# Patient Record
Sex: MALE | Race: WHITE | NOT HISPANIC OR LATINO | Employment: OTHER | URBAN - METROPOLITAN AREA
[De-identification: names, ages, dates, MRNs, and addresses within clinical notes are randomized per-mention and may not be internally consistent; named-entity substitution may affect disease eponyms.]

---

## 2020-08-17 PROBLEM — Z96.641 HISTORY OF HIP REPLACEMENT, TOTAL, RIGHT: Status: ACTIVE | Noted: 2020-08-17

## 2020-08-18 ENCOUNTER — TELEPHONE (OUTPATIENT)
Dept: FAMILY MEDICINE | Facility: CLINIC | Age: 75
End: 2020-08-18

## 2020-08-18 ENCOUNTER — OFFICE VISIT (OUTPATIENT)
Dept: FAMILY MEDICINE | Facility: CLINIC | Age: 75
End: 2020-08-18
Payer: MEDICARE

## 2020-08-18 VITALS
BODY MASS INDEX: 25.16 KG/M2 | DIASTOLIC BLOOD PRESSURE: 68 MMHG | SYSTOLIC BLOOD PRESSURE: 147 MMHG | WEIGHT: 166 LBS | HEIGHT: 68 IN | TEMPERATURE: 97.7 F | HEART RATE: 41 BPM | OXYGEN SATURATION: 100 %

## 2020-08-18 DIAGNOSIS — H61.22 IMPACTED CERUMEN OF LEFT EAR: ICD-10-CM

## 2020-08-18 DIAGNOSIS — Z13.6 CARDIOVASCULAR SCREENING; LDL GOAL LESS THAN 130: ICD-10-CM

## 2020-08-18 DIAGNOSIS — R00.1 BRADYCARDIA: ICD-10-CM

## 2020-08-18 DIAGNOSIS — R03.0 ELEVATED BLOOD PRESSURE READING WITHOUT DIAGNOSIS OF HYPERTENSION: ICD-10-CM

## 2020-08-18 DIAGNOSIS — K62.5 RECTAL BLEEDING: Primary | ICD-10-CM

## 2020-08-18 DIAGNOSIS — Z12.11 COLON CANCER SCREENING: ICD-10-CM

## 2020-08-18 DIAGNOSIS — Z96.641 HISTORY OF HIP REPLACEMENT, TOTAL, RIGHT: ICD-10-CM

## 2020-08-18 LAB
ANION GAP SERPL CALCULATED.3IONS-SCNC: 6 MMOL/L (ref 3–14)
BUN SERPL-MCNC: 15 MG/DL (ref 7–30)
CALCIUM SERPL-MCNC: 8.9 MG/DL (ref 8.5–10.1)
CHLORIDE SERPL-SCNC: 107 MMOL/L (ref 94–109)
CHOLEST SERPL-MCNC: 212 MG/DL
CO2 SERPL-SCNC: 26 MMOL/L (ref 20–32)
CREAT SERPL-MCNC: 1.04 MG/DL (ref 0.66–1.25)
ERYTHROCYTE [DISTWIDTH] IN BLOOD BY AUTOMATED COUNT: 13.3 % (ref 10–15)
GFR SERPL CREATININE-BSD FRML MDRD: 70 ML/MIN/{1.73_M2}
GLUCOSE SERPL-MCNC: 89 MG/DL (ref 70–99)
HCT VFR BLD AUTO: 46.8 % (ref 40–53)
HDLC SERPL-MCNC: 71 MG/DL
HGB BLD-MCNC: 15.6 G/DL (ref 13.3–17.7)
LDLC SERPL CALC-MCNC: 116 MG/DL
MCH RBC QN AUTO: 33.3 PG (ref 26.5–33)
MCHC RBC AUTO-ENTMCNC: 33.3 G/DL (ref 31.5–36.5)
MCV RBC AUTO: 100 FL (ref 78–100)
NONHDLC SERPL-MCNC: 141 MG/DL
PLATELET # BLD AUTO: 210 10E9/L (ref 150–450)
POTASSIUM SERPL-SCNC: 4.1 MMOL/L (ref 3.4–5.3)
RBC # BLD AUTO: 4.68 10E12/L (ref 4.4–5.9)
SODIUM SERPL-SCNC: 139 MMOL/L (ref 133–144)
TRIGL SERPL-MCNC: 127 MG/DL
WBC # BLD AUTO: 6.7 10E9/L (ref 4–11)

## 2020-08-18 PROCEDURE — 93000 ELECTROCARDIOGRAM COMPLETE: CPT | Mod: 59 | Performed by: INTERNAL MEDICINE

## 2020-08-18 PROCEDURE — 36415 COLL VENOUS BLD VENIPUNCTURE: CPT | Performed by: INTERNAL MEDICINE

## 2020-08-18 PROCEDURE — 80061 LIPID PANEL: CPT | Performed by: INTERNAL MEDICINE

## 2020-08-18 PROCEDURE — 80048 BASIC METABOLIC PNL TOTAL CA: CPT | Performed by: INTERNAL MEDICINE

## 2020-08-18 PROCEDURE — 69209 REMOVE IMPACTED EAR WAX UNI: CPT | Mod: LT | Performed by: INTERNAL MEDICINE

## 2020-08-18 PROCEDURE — 85027 COMPLETE CBC AUTOMATED: CPT | Performed by: INTERNAL MEDICINE

## 2020-08-18 PROCEDURE — 99203 OFFICE O/P NEW LOW 30 MIN: CPT | Mod: 25 | Performed by: INTERNAL MEDICINE

## 2020-08-18 ASSESSMENT — MIFFLIN-ST. JEOR: SCORE: 1466.44

## 2020-08-18 NOTE — TELEPHONE ENCOUNTER
TC received from provider..              Please call him and ask him if he would be agreeable to wearing a heart monitor for a couple weeks ([place]).  We could set it up here sometime this week.   To watch his low heart rate.

## 2020-08-18 NOTE — Clinical Note
Please call him and ask him if he would be agreeable to wearing a heart monitor for a couple weeks ([place]).  We could set it up here sometime this week.   To watch his low heart rate.

## 2020-08-18 NOTE — PROGRESS NOTES
"Subjective     Aurelio Hassan is a 74 year old male who presents to clinic today for the following health issues:    HPI   73 y/o  male, new to this clinic.  Made this appointment due to Rectal bleeding.   Painless.       Not always with a BM    no Constipation     No Fever/sweats/chills.  Appetite is okay    Reports colonoscopy negative \"many years ago\"         Rectal bleeding       Duration: 2-3 weeks     Description (location/character/radiation): rectal bleeding     Intensity:  moderate    Accompanying signs and symptoms: none    History (similar episodes/previous evaluation): None    Precipitating or alleviating factors: None    Therapies tried and outcome: None     New Patient/Transfer of Care    Patient Active Problem List   Diagnosis     Nuclear sclerosis of both eyes     Posterior vitreous detachment of right eye     Pterygium of left eye     History of hip replacement, total, right     CARDIOVASCULAR SCREENING; LDL GOAL LESS THAN 130     History reviewed. No pertinent surgical history.    Social History     Tobacco Use     Smoking status: Former Smoker     Last attempt to quit: 1970     Years since quittin.6     Smokeless tobacco: Never Used   Substance Use Topics     Alcohol use: Yes     Comment: 2 drinks daily      History reviewed. No pertinent family history.      No current outpatient medications on file.     No Known Allergies  No lab results found.   BP Readings from Last 3 Encounters:   20 (!) 147/68    Wt Readings from Last 3 Encounters:   20 75.3 kg (166 lb)                    Reviewed and updated as needed this visit by Provider         Review of Systems   Constitutional, HEENT, cardiovascular, pulmonary, gi and gu systems are negative, except as otherwise noted.      Objective    BP (!) 147/68 (BP Location: Left arm, Patient Position: Sitting, Cuff Size: Adult Regular)   Pulse (!) 41   Temp 97.7  F (36.5  C) (Oral)   Ht 1.734 m (5' 8.25\")   Wt 75.3 kg (166 lb)   " SpO2 100%   BMI 25.06 kg/m    Body mass index is 25.06 kg/m .     Physical Exam   GENERAL: healthy, alert and no distress  EYES: Eyes grossly normal to inspection, PERRL and conjunctivae and sclerae normal  HENT: ear canals and TM's normal, nose and mouth without ulcers or lesions  HENT: L cerumen impaction, s/p successful irrigation.   NECK: no adenopathy, no asymmetry, masses, or scars and thyroid normal to palpation  RESP: lungs clear to auscultation - no rales, rhonchi or wheezes  CV: regular rate and rhythm, bradycardia... normal S1 S2, no S3 or S4, no murmur, click or rub, no peripheral edema and peripheral pulses strong  ABDOMEN: soft, nontender, no hepatosplenomegaly, no masses and bowel sounds normal  RECTAL: normal sphincter tone, no rectal masses, prostate normal size, smooth, nontender without nodules or masses   Insignificant, non bleeding hernia at 5 or 6 oclock.    No pain during exam.   MS: no gross musculoskeletal defects noted, no edema  SKIN: no suspicious lesions or rashes  NEURO: Normal strength and tone, mentation intact and speech normal  PSYCH: mentation appears normal, affect normal/bright    Diagnostic Test Results:  Labs reviewed in Epic  Results for orders placed or performed in visit on 08/18/20 (from the past 24 hour(s))   CBC with platelets   Result Value Ref Range    WBC 6.7 4.0 - 11.0 10e9/L    RBC Count 4.68 4.4 - 5.9 10e12/L    Hemoglobin 15.6 13.3 - 17.7 g/dL    Hematocrit 46.8 40.0 - 53.0 %     78 - 100 fl    MCH 33.3 (H) 26.5 - 33.0 pg    MCHC 33.3 31.5 - 36.5 g/dL    RDW 13.3 10.0 - 15.0 %    Platelet Count 210 150 - 450 10e9/L     BMP and FLP are pending    EKG = Sinus bradycardia with 1st degree AVB, HR 37         Assessment & Plan     1. Rectal bleeding  *no findings on exam.  Colonoscopy overdue.   - CBC with platelets    2. History of hip replacement, total, right       3. Elevated blood pressure reading without diagnosis of hypertension   no meds added   - Basic  "metabolic panel    4. CARDIOVASCULAR SCREENING; LDL GOAL LESS THAN 130     - Lipid panel reflex to direct LDL Non-fasting    5. Colon cancer screening     - GASTROENTEROLOGY ADULT REF PROCEDURE ONLY; Future    6. Bradycardia   his HR is low, he is completely asymptomatic.  He reports his HR usually in low 50's.  He is active and has had no symptoms   - EKG 12-lead complete w/read - Clinics  LATER ENTRY:   He will get HOLTER for a couple weeks.      7. Impacted cerumen of left ear     - REMOVE IMPACTED CERUMEN     BMI:   Estimated body mass index is 25.06 kg/m  as calculated from the following:    Height as of this encounter: 1.734 m (5' 8.25\").    Weight as of this encounter: 75.3 kg (166 lb).                Return in about 4 weeks (around 9/15/2020) for Physical Exam w/Dr Black as scheduled.    Ashlee Williamson MD  [facility]    "

## 2020-08-18 NOTE — LETTER
[facility]   [address] [place]  [address]  [phone]                                   August 19, 2020    Aurelio Hassan  [address]  [facility] [address]        Dear Aurelio,    Enclosed are your results.  Your labs are normal/stable at this time.   Cholesterol looks great.  Electrolytes and kidney function are normal.   I checked for anemia, and your blood counts are completely normal at this time.     Please call  with any questions.  We can also discuss any questions regarding these labs at your next scheduled visit.     Results for orders placed or performed in visit on 08/18/20   Basic metabolic panel     Status: None   Result Value Ref Range    Sodium 139 133 - 144 mmol/L    Potassium 4.1 3.4 - 5.3 mmol/L    Chloride 107 94 - 109 mmol/L    Carbon Dioxide 26 20 - 32 mmol/L    Anion Gap 6 3 - 14 mmol/L    Glucose 89 70 - 99 mg/dL    Urea Nitrogen 15 7 - 30 mg/dL    Creatinine 1.04 0.66 - 1.25 mg/dL    GFR Estimate 70 >60 mL/min/[1.73_m2]    GFR Estimate If Black 81 >60 mL/min/[1.73_m2]    Calcium 8.9 8.5 - 10.1 mg/dL   Lipid panel reflex to direct LDL Non-fasting     Status: Abnormal   Result Value Ref Range    Cholesterol 212 (H) <200 mg/dL    Triglycerides 127 <150 mg/dL    HDL Cholesterol 71 >39 mg/dL    LDL Cholesterol Calculated 116 (H) <100 mg/dL    Non HDL Cholesterol 141 (H) <130 mg/dL   CBC with platelets     Status: Abnormal   Result Value Ref Range    WBC 6.7 4.0 - 11.0 10e9/L    RBC Count 4.68 4.4 - 5.9 10e12/L    Hemoglobin 15.6 13.3 - 17.7 g/dL    Hematocrit 46.8 40.0 - 53.0 %     78 - 100 fl    MCH 33.3 (H) 26.5 - 33.0 pg    MCHC 33.3 31.5 - 36.5 g/dL    RDW 13.3 10.0 - 15.0 %    Platelet Count 210 150 - 450 10e9/L       If you have any questions please call the clinic at [phone]    Sincerely,    Ashlee Williamson MD  bmd

## 2020-08-18 NOTE — TELEPHONE ENCOUNTER
Attempted to call patient at home number, not in service.    Attempted to call patient at mobile number, left message on voicemail; patient was instructed to return call to [facility] RN directly on the RN call back line at [phone]     Patient was seen by [facility] today, abnormal EKG, see below, advising zio patch.    Kaila Wang, BABITA  [facility]

## 2020-08-19 NOTE — TELEPHONE ENCOUNTER
Patient returned call, nurse picked up call, informed of provider message regarding Ziopatch. Scheduled ancillary for application on Monday August 24 th. Nurse answered patient questions to best of ability.    Patient verbalized understanding and agreement with plan and had no further questions.      Loli Galloway RN  Triage Nurse  [facility]  Appointment line: [phone]  [facility], 24 hour nurse line, available by calling clinic at [phone] and following prompts.

## 2020-08-19 NOTE — TELEPHONE ENCOUNTER
Called patient at [phone]. Left message to return call to nurse triage line at [phone].    Rosette Mccarty RN

## 2020-08-19 NOTE — RESULT ENCOUNTER NOTE
Aurelio Hassan    Enclosed are your results.  Your labs are normal/stable at this time.   Cholesterol looks great.  Electrolytes and kidney function are normal.   I checked for anemia, and your blood counts are completely normal at this time.     Please call  with any questions.  We can also discuss any questions regarding these labs at your next scheduled visit.    Sincerely,    Ashlee Williamson M.D.

## 2020-08-19 NOTE — TELEPHONE ENCOUNTER
Patient returned call to triage line.  He left a message stating to call him back at [phone] (A).    Ange Charles RN,BSN  [facility]

## 2020-08-20 ENCOUNTER — MYC MEDICAL ADVICE (OUTPATIENT)
Dept: FAMILY MEDICINE | Facility: CLINIC | Age: 75
End: 2020-08-20

## 2020-08-20 NOTE — TELEPHONE ENCOUNTER
Patient was seen 8/18/20 by [facility].    I see colonoscopy referral was signed.   [facility] location.    Routed message to patient advising referral is in chart.    Routed to team to reach out to [facility], does something need to be faxed?    Kaila Wang RN  [facility]

## 2020-08-24 ENCOUNTER — TELEPHONE (OUTPATIENT)
Dept: FAMILY MEDICINE | Facility: CLINIC | Age: 75
End: 2020-08-24

## 2020-08-24 ENCOUNTER — ALLIED HEALTH/NURSE VISIT (OUTPATIENT)
Dept: NURSING | Facility: CLINIC | Age: 75
End: 2020-08-24
Payer: MEDICARE

## 2020-08-24 DIAGNOSIS — R00.1 BRADYCARDIA: Primary | ICD-10-CM

## 2020-08-24 DIAGNOSIS — R00.1 BRADYCARDIA: ICD-10-CM

## 2020-08-24 PROCEDURE — 99207 ZZC NO CHARGE NURSE ONLY: CPT

## 2020-08-24 PROCEDURE — 0296T ZZHC ZIO PATCH HOLTER APPLICATION: CPT

## 2020-08-26 ENCOUNTER — TELEPHONE (OUTPATIENT)
Dept: SURGERY | Facility: CLINIC | Age: 75
End: 2020-08-26

## 2020-08-26 DIAGNOSIS — Z11.59 ENCOUNTER FOR SCREENING FOR OTHER VIRAL DISEASES: Primary | ICD-10-CM

## 2020-08-26 NOTE — TELEPHONE ENCOUNTER
Location: [facility]  Is this a cancellation or reschedule?  no  The name of the procedure: Colonoscopy  Provider scheduled with: Adore  Date 9/21/2020, Time 9

## 2020-09-04 ENCOUNTER — OFFICE VISIT (OUTPATIENT)
Dept: FAMILY MEDICINE | Facility: CLINIC | Age: 75
End: 2020-09-04
Payer: MEDICARE

## 2020-09-04 VITALS
BODY MASS INDEX: 25.36 KG/M2 | OXYGEN SATURATION: 99 % | HEART RATE: 45 BPM | TEMPERATURE: 97.9 F | SYSTOLIC BLOOD PRESSURE: 128 MMHG | DIASTOLIC BLOOD PRESSURE: 73 MMHG | WEIGHT: 168 LBS

## 2020-09-04 DIAGNOSIS — Z00.00 ENCOUNTER FOR MEDICARE ANNUAL WELLNESS EXAM: Primary | ICD-10-CM

## 2020-09-04 DIAGNOSIS — R00.1 SINUS BRADYCARDIA: ICD-10-CM

## 2020-09-04 DIAGNOSIS — Z85.828 HISTORY OF SKIN CANCER: ICD-10-CM

## 2020-09-04 DIAGNOSIS — Z87.19 HISTORY OF RECTAL BLEEDING: ICD-10-CM

## 2020-09-04 PROCEDURE — G0438 PPPS, INITIAL VISIT: HCPCS | Performed by: FAMILY MEDICINE

## 2020-09-04 ASSESSMENT — ENCOUNTER SYMPTOMS
JOINT SWELLING: 0
EYE PAIN: 0
PARESTHESIAS: 0
COUGH: 0
CONSTIPATION: 0
MYALGIAS: 0
ARTHRALGIAS: 0
SHORTNESS OF BREATH: 0
HEMATURIA: 0
ABDOMINAL PAIN: 0
NAUSEA: 0
DIZZINESS: 0
CHILLS: 0
NERVOUS/ANXIOUS: 0
HEMATOCHEZIA: 0
HEARTBURN: 0
SORE THROAT: 0
WEAKNESS: 0
FREQUENCY: 0
DYSURIA: 0
FEVER: 0
PALPITATIONS: 0
DIARRHEA: 0
HEADACHES: 0

## 2020-09-04 ASSESSMENT — ACTIVITIES OF DAILY LIVING (ADL): CURRENT_FUNCTION: NO ASSISTANCE NEEDED

## 2020-09-04 ASSESSMENT — PAIN SCALES - GENERAL: PAINLEVEL: NO PAIN (0)

## 2020-09-04 NOTE — PROGRESS NOTES
"SUBJECTIVE:   Aurelio Hassan is a 74 year old male who presents for a Preventive Visit and to establish primary care.  He is fairly new to our clinic.  His wife sees Dr. Williamson in our clinic and he saw her a couple of weeks ago with some rectal bleeding.  His rectal bleeding has stopped, but he is set up to have a colonoscopy in a couple of weeks to further evaluate that.  He is generally quite healthy.  He is on no routine medications.  He plays tennis a few times per week.  He has had some skin cancers removed on the left side of his neck and the left upper chest.  He does see dermatology on a regular basis.  He does spend 6 months or so in [place], [place] each winter.    He had a low resting heart rate when he was seen a couple of weeks ago and he is now undergoing a Zio patch evaluation for that.  He is asymptomatic with it.    Are you in the first 12 months of your Medicare coverage?  No    Healthy Habits:     In general, how would you rate your overall health?  Excellent    Frequency of exercise:  4-5 days/week    Duration of exercise:  45-60 minutes    Do you usually eat at least 4 servings of fruit and vegetables a day, include whole grains    & fiber and avoid regularly eating high fat or \"junk\" foods?  Yes    Taking medications regularly:  Yes    Ability to successfully perform activities of daily living:  No assistance needed    Home Safety:  No safety concerns identified    Hearing Impairment:  Need to ask people to speak up or repeat themselves    In the past 6 months, have you been bothered by leaking of urine?  No    In general, how would you rate your overall mental or emotional health?  Excellent      PHQ-2 Total Score: 0    Additional concerns today:  No     Do you feel safe in your environment? Yes    Have you ever done Advance Care Planning? (For example, a Health Directive, POLST, or a discussion with a medical provider or your loved ones about your wishes): Yes, patient states has an " Advance Care Planning document and will bring a copy to the clinic.      Fall risk  Fallen 2 or more times in the past year?: No  Any fall with injury in the past year?: No    Cognitive Screening   1) Repeat 3 items (Leader, Season, Table)    2) Clock draw: NORMAL  3) 3 item recall: Recalls 3 objects  Results: 3 items recalled: COGNITIVE IMPAIRMENT LESS LIKELY    Mini-CogTM Copyright STEPH Torres. Licensed by the author for use in [facility]; reprinted with permission (elio@[facility]). All rights reserved.      Do you have sleep apnea, excessive snoring or daytime drowsiness?: no    Reviewed and updated as needed this visit by clinical staff         Reviewed and updated as needed this visit by Provider        Social History     Tobacco Use     Smoking status: Former Smoker     Last attempt to quit: 1970     Years since quittin.7     Smokeless tobacco: Never Used   Substance Use Topics     Alcohol use: Yes     Comment: 2 drinks daily      If you drink alcohol do you typically have >3 drinks per day or >7 drinks per week? No    Alcohol Use 2020   Prescreen: >3 drinks/day or >7 drinks/week? No   No flowsheet data found.      Vee Coughlin MA      Current providers sharing in care for this patient include:   Patient Care Team:  Win Black MD as PCP - General (Family Practice)    The following health maintenance items are reviewed in Epic and correct as of today:  Health Maintenance   Topic Date Due     COLORECTAL CANCER SCREENING  1955     MEDICARE ANNUAL WELLNESS VISIT  2009     AORTIC ANEURYSM SCREENING (SYSTEM ASSIGNED)  2009     EYE EXAM  2017     INFLUENZA VACCINE (1) 2020     ZOSTER IMMUNIZATION (2 of 2) 2020     FALL RISK ASSESSMENT  2021     LIPID  2025     ADVANCE CARE PLANNING  2025     DTAP/TDAP/TD IMMUNIZATION (3 - Td) 2027     PHQ-2  Completed     PNEUMOCOCCAL IMMUNIZATION 65+ LOW/MEDIUM RISK  Completed     IPV  IMMUNIZATION  Aged Out     MENINGITIS IMMUNIZATION  Aged Out     HEPATITIS B IMMUNIZATION  Aged Out     Patient Active Problem List   Diagnosis     Nuclear sclerosis of both eyes     Posterior vitreous detachment of right eye     Pterygium of left eye     History of hip replacement, total, right     CARDIOVASCULAR SCREENING; LDL GOAL LESS THAN 130     History of skin cancer     Sinus bradycardia     History reviewed. No pertinent surgical history.    Social History     Tobacco Use     Smoking status: Former Smoker     Last attempt to quit: 1970     Years since quittin.7     Smokeless tobacco: Never Used   Substance Use Topics     Alcohol use: Yes     Comment: 2 drinks daily      History reviewed. No pertinent family history.      No current outpatient medications on file.     No Known Allergies      Review of Systems   Constitutional: Negative for chills and fever.   HENT: Positive for hearing loss. Negative for congestion, ear pain and sore throat.    Eyes: Negative for pain and visual disturbance.   Respiratory: Negative for cough and shortness of breath.    Cardiovascular: Negative for chest pain, palpitations and peripheral edema.   Gastrointestinal: Negative for abdominal pain, constipation, diarrhea, heartburn, hematochezia and nausea.   Genitourinary: Negative for discharge, dysuria, frequency, genital sores, hematuria, impotence and urgency.   Musculoskeletal: Negative for arthralgias, joint swelling and myalgias.   Skin: Negative for rash.   Neurological: Negative for dizziness, weakness, headaches and paresthesias.   Psychiatric/Behavioral: Negative for mood changes. The patient is not nervous/anxious.          OBJECTIVE:   /73 (BP Location: Right arm, Patient Position: Chair, Cuff Size: Adult Regular)   Pulse (!) 45   Temp 97.9  F (36.6  C) (Oral)   Wt 76.2 kg (168 lb)   SpO2 99%   BMI 25.36 kg/m   Estimated body mass index is 25.36 kg/m  as calculated from the following:    Height as  "of 8/18/20: 1.734 m (5' 8.25\").    Weight as of this encounter: 76.2 kg (168 lb).  Physical Exam  GENERAL: healthy, alert and no distress  EYES: Eyes grossly normal to inspection, PERRL and conjunctivae and sclerae normal  HENT:   NECK: no adenopathy, no asymmetry, masses, or scars and thyroid normal to palpation  RESP: lungs clear to auscultation - no rales, rhonchi or wheezes  CV: Bradycardic regular rate and rhythm, normal S1 S2, no S3 or S4, no murmur, click or rub, no peripheral edema and peripheral pulses strong  ABDOMEN: soft, nontender, no hepatosplenomegaly, no masses and bowel sounds normal  MS: no gross musculoskeletal defects noted, no edema  SKIN: no suspicious lesions or rashes, but well-healed surgical excision sites on the left side of his neck inferior and posterior to the left ear and also on the left upper chest.  NEURO: Normal strength and tone, mentation intact and speech normal  PSYCH: mentation appears normal, affect normal/bright    Diagnostic Test Results:  Labs reviewed in Epic  Office Visit on 08/18/2020   Component Date Value Ref Range Status     Sodium 08/18/2020 139  133 - 144 mmol/L Final     Potassium 08/18/2020 4.1  3.4 - 5.3 mmol/L Final     Chloride 08/18/2020 107  94 - 109 mmol/L Final     Carbon Dioxide 08/18/2020 26  20 - 32 mmol/L Final     Anion Gap 08/18/2020 6  3 - 14 mmol/L Final     Glucose 08/18/2020 89  70 - 99 mg/dL Final    Non Fasting     Urea Nitrogen 08/18/2020 15  7 - 30 mg/dL Final     Creatinine 08/18/2020 1.04  0.66 - 1.25 mg/dL Final     GFR Estimate 08/18/2020 70  >60 mL/min/[1.73_m2] Final    Comment: Non  GFR Calc  Starting 12/18/2018, serum creatinine based estimated GFR (eGFR) will be   calculated using the Chronic Kidney Disease Epidemiology Collaboration   (CKD-EPI) equation.       GFR Estimate If Black 08/18/2020 81  >60 mL/min/[1.73_m2] Final    Comment:  GFR Calc  Starting 12/18/2018, serum creatinine based estimated " GFR (eGFR) will be   calculated using the Chronic Kidney Disease Epidemiology Collaboration   (CKD-EPI) equation.       Calcium 08/18/2020 8.9  8.5 - 10.1 mg/dL Final     Cholesterol 08/18/2020 212* <200 mg/dL Final    Desirable:       <200 mg/dl     Triglycerides 08/18/2020 127  <150 mg/dL Final    Non Fasting     HDL Cholesterol 08/18/2020 71  >39 mg/dL Final     LDL Cholesterol Calculated 08/18/2020 116* <100 mg/dL Final    Comment: Above desirable:  100-129 mg/dl  Borderline High:  130-159 mg/dL  High:             160-189 mg/dL  Very high:       >189 mg/dl       Non HDL Cholesterol 08/18/2020 141* <130 mg/dL Final    Comment: Above Desirable:  130-159 mg/dl  Borderline high:  160-189 mg/dl  High:             190-219 mg/dl  Very high:       >219 mg/dl       WBC 08/18/2020 6.7  4.0 - 11.0 10e9/L Final     RBC Count 08/18/2020 4.68  4.4 - 5.9 10e12/L Final     Hemoglobin 08/18/2020 15.6  13.3 - 17.7 g/dL Final     Hematocrit 08/18/2020 46.8  40.0 - 53.0 % Final     MCV 08/18/2020 100  78 - 100 fl Final     MCH 08/18/2020 33.3* 26.5 - 33.0 pg Final     MCHC 08/18/2020 33.3  31.5 - 36.5 g/dL Final     RDW 08/18/2020 13.3  10.0 - 15.0 % Final     Platelet Count 08/18/2020 210  150 - 450 10e9/L Final         ASSESSMENT / PLAN:       ICD-10-CM    1. Encounter for Medicare annual wellness exam  Z00.00    2. Sinus bradycardia  R00.1    3. History of rectal bleeding  Z87.19    4. History of skin cancer  Z85.828      Blood pressure and other vital signs look good today except for the low heart rate  We will see what his Zio patch shows for that, but it can likely be observed since he is asymptomatic with it and is quite physically active  He will follow through with the colonoscopy in a couple of weeks as scheduled  Continue routine dermatology follow-up  Continue good diet and exercise habits  Plan a recheck in 1 year, or sooner prn    COUNSELING:  Reviewed preventive health counseling, as reflected in patient  "instructions       Regular exercise       Healthy diet/nutrition    Estimated body mass index is 25.06 kg/m  as calculated from the following:    Height as of 8/18/20: 1.734 m (5' 8.25\").    Weight as of 8/18/20: 75.3 kg (166 lb).        He reports that he quit smoking about 50 years ago. He has never used smokeless tobacco.      Appropriate preventive services were discussed with this patient, including applicable screening as appropriate for cardiovascular disease, diabetes, osteopenia/osteoporosis, and glaucoma.  As appropriate for age/gender, discussed screening for colorectal cancer, prostate cancer, breast cancer, and cervical cancer. Checklist reviewing preventive services available has been given to the patient.    Reviewed patients plan of care and provided an AVS. The Basic Care Plan (routine screening as documented in Health Maintenance) for Aurelio meets the Care Plan requirement. This Care Plan has been established and reviewed with the Patient.    Counseling Resources:  ATP IV Guidelines  Pooled Cohorts Equation Calculator  Breast Cancer Risk Calculator  Breast Cancer: Medication to Reduce Risk  FRAX Risk Assessment  ICSI Preventive Guidelines  Dietary Guidelines for Americans, 2010  USDA's MyPlate  ASA Prophylaxis  Lung CA Screening    Win Black MD  [facility]    Identified Health Risks:  "

## 2020-09-04 NOTE — PATIENT INSTRUCTIONS
Patient Education   Personalized Prevention Plan  You are due for the preventive services outlined below.  Your care team is available to assist you in scheduling these services.  If you have already completed any of these items, please share that information with your care team to update in your medical record.  Health Maintenance Due   Topic Date Due     Colorectal Cancer Screening  11/25/1955     Annual Wellness Visit  11/25/2009     AORTIC ANEURYSM SCREENING (SYSTEM ASSIGNED)  11/25/2009     Eye Exam  08/22/2017     Flu Vaccine (1) 09/01/2020     Zoster (Shingles) Vaccine (2 of 2) 04/14/2020

## 2020-09-11 RX ORDER — BISACODYL 5 MG
5 TABLET, DELAYED RELEASE (ENTERIC COATED) ORAL SEE ADMIN INSTRUCTIONS
Qty: 1 TABLET | Refills: 0 | Status: SHIPPED | OUTPATIENT
Start: 2020-09-11 | End: 2021-08-31

## 2020-09-11 RX ORDER — SODIUM, POTASSIUM,MAG SULFATES 17.5-3.13G
1 SOLUTION, RECONSTITUTED, ORAL ORAL SEE ADMIN INSTRUCTIONS
Qty: 2 BOTTLE | Refills: 0 | Status: SHIPPED | OUTPATIENT
Start: 2020-09-11 | End: 2021-08-31

## 2020-09-11 ASSESSMENT — MIFFLIN-ST. JEOR: SCORE: 1457.94

## 2020-09-17 DIAGNOSIS — Z11.59 ENCOUNTER FOR SCREENING FOR OTHER VIRAL DISEASES: ICD-10-CM

## 2020-09-17 PROCEDURE — U0003 INFECTIOUS AGENT DETECTION BY NUCLEIC ACID (DNA OR RNA); SEVERE ACUTE RESPIRATORY SYNDROME CORONAVIRUS 2 (SARS-COV-2) (CORONAVIRUS DISEASE [COVID-19]), AMPLIFIED PROBE TECHNIQUE, MAKING USE OF HIGH THROUGHPUT TECHNOLOGIES AS DESCRIBED BY CMS-2020-01-R: HCPCS | Performed by: SURGERY

## 2020-09-18 LAB
SARS-COV-2 RNA SPEC QL NAA+PROBE: NOT DETECTED
SPECIMEN SOURCE: NORMAL

## 2020-09-21 ENCOUNTER — HOSPITAL ENCOUNTER (OUTPATIENT)
Facility: AMBULATORY SURGERY CENTER | Age: 75
Discharge: HOME | End: 2020-09-21
Attending: SURGERY | Admitting: SURGERY
Payer: MEDICARE

## 2020-09-21 VITALS
OXYGEN SATURATION: 97 % | DIASTOLIC BLOOD PRESSURE: 77 MMHG | RESPIRATION RATE: 16 BRPM | HEART RATE: 49 BPM | SYSTOLIC BLOOD PRESSURE: 119 MMHG | HEIGHT: 68 IN | BODY MASS INDEX: 25.01 KG/M2 | TEMPERATURE: 97.5 F | WEIGHT: 165 LBS

## 2020-09-21 DIAGNOSIS — Z12.11 SCREENING FOR COLON CANCER: Primary | ICD-10-CM

## 2020-09-21 LAB — COLONOSCOPY: NORMAL

## 2020-09-21 PROCEDURE — G8907 PT DOC NO EVENTS ON DISCHARG: HCPCS

## 2020-09-21 PROCEDURE — 99152 MOD SED SAME PHYS/QHP 5/>YRS: CPT | Mod: 59 | Performed by: SURGERY

## 2020-09-21 PROCEDURE — G8918 PT W/O PREOP ORDER IV AB PRO: HCPCS

## 2020-09-21 PROCEDURE — 45378 DIAGNOSTIC COLONOSCOPY: CPT

## 2020-09-21 PROCEDURE — 45378 DIAGNOSTIC COLONOSCOPY: CPT | Performed by: SURGERY

## 2020-09-21 RX ORDER — FENTANYL CITRATE 50 UG/ML
INJECTION, SOLUTION INTRAMUSCULAR; INTRAVENOUS PRN
Status: DISCONTINUED | OUTPATIENT
Start: 2020-09-21 | End: 2020-09-21 | Stop reason: HOSPADM

## 2020-09-21 RX ORDER — ONDANSETRON 4 MG/1
4 TABLET, ORALLY DISINTEGRATING ORAL EVERY 6 HOURS PRN
Status: DISCONTINUED | OUTPATIENT
Start: 2020-09-21 | End: 2020-09-22 | Stop reason: HOSPADM

## 2020-09-21 RX ORDER — NALOXONE HYDROCHLORIDE 0.4 MG/ML
.1-.4 INJECTION, SOLUTION INTRAMUSCULAR; INTRAVENOUS; SUBCUTANEOUS
Status: DISCONTINUED | OUTPATIENT
Start: 2020-09-21 | End: 2020-09-22 | Stop reason: HOSPADM

## 2020-09-21 RX ORDER — ONDANSETRON 2 MG/ML
4 INJECTION INTRAMUSCULAR; INTRAVENOUS EVERY 6 HOURS PRN
Status: DISCONTINUED | OUTPATIENT
Start: 2020-09-21 | End: 2020-09-22 | Stop reason: HOSPADM

## 2020-09-21 RX ORDER — LIDOCAINE 40 MG/G
CREAM TOPICAL
Status: DISCONTINUED | OUTPATIENT
Start: 2020-09-21 | End: 2020-09-22 | Stop reason: HOSPADM

## 2020-09-21 RX ORDER — FLUMAZENIL 0.1 MG/ML
0.2 INJECTION, SOLUTION INTRAVENOUS
Status: SHIPPED | OUTPATIENT
Start: 2020-09-21 | End: 2020-09-21

## 2020-09-21 NOTE — DISCHARGE INSTRUCTIONS
The most likely source of your bleeding is your hemorrhoids.    Try the conservative treatment first and see if that works and if not then:    If the above does not help then we can band these in the office.  If you want to have the banding drink 1 bottle of magnesium citrate the day before the banding.      If not helping then make an appointment by bladimir [phone],  for banding and will need 45 minutes. And discussed risks including pain and infection and possible re banding in future.

## 2020-10-20 ENCOUNTER — MYC MEDICAL ADVICE (OUTPATIENT)
Dept: FAMILY MEDICINE | Facility: CLINIC | Age: 75
End: 2020-10-20

## 2020-10-20 NOTE — TELEPHONE ENCOUNTER
No, I don't.  Glad he asked.... could I get help getting this faxed?  Then I can review and send him note.

## 2020-10-20 NOTE — TELEPHONE ENCOUNTER
Sent message to TC to see if we have a phone number to call for zio patch results.    [phone]    I called this number is [place], chose option 2.    Spoke to rep, she cannot find anyone in their system with this patient's name and date of birth.    She asked if I had the serial number of the device.  I don't see it documented in encounter on 8/24.    Flagged for MA to address, perhaps they record Zio Patch placements somewhere?    Kaila Wang RN  [facility]

## 2020-10-20 NOTE — TELEPHONE ENCOUNTER
See patient's mychart inquiry, has not gotten results of zio patch?   I see it was placed 8/24/20.    Patient was seen by  9/4/20 for routine visit.  Still had zio patch on at that point.    Routed to Dr. Black, did you receive any zio patch report yet?    Kaila Wang RN  [facility]

## 2020-10-20 NOTE — TELEPHONE ENCOUNTER
I have not received any results.  They would typically go to the ordering provider.  I will forward this to Dr. Williamson to see if she received the results.  I do not see them in his chart.

## 2020-10-22 NOTE — TELEPHONE ENCOUNTER
MD spoke with patient about ziopatch, some significant bradycardia found.   He will seed [place] cardiologist as he is in [place]    We will send full copy of zio report. (put on TC desk)    -- please notify neda lowe that this is second report I did not get for some reason. Had patient not called me a month later wondering for results, I would not have know of it.  HARRISON

## 2020-10-22 NOTE — TELEPHONE ENCOUNTER
Reason for Call:  Other     Detailed comments: Patient wife requesting for results to be sent to     [address] [place] [address]    Please advise.     Phone Number Patient can be reached at: Other phone number:  [phone]    Best Time: Anytime     Can we leave a detailed message on this number? YES    Call taken on 10/22/2020 at 2:08 PM by Hayley Quiroz

## 2020-10-30 ENCOUNTER — MYC MEDICAL ADVICE (OUTPATIENT)
Dept: FAMILY MEDICINE | Facility: CLINIC | Age: 75
End: 2020-10-30

## 2020-11-24 ENCOUNTER — MYC MEDICAL ADVICE (OUTPATIENT)
Dept: FAMILY MEDICINE | Facility: CLINIC | Age: 75
End: 2020-11-24

## 2020-11-24 NOTE — TELEPHONE ENCOUNTER
Flagged for TC to advise if/when release for records to be sent to [facility] provider is received.  Kaila Wang RN  [facility]

## 2021-01-15 ENCOUNTER — HEALTH MAINTENANCE LETTER (OUTPATIENT)
Age: 76
End: 2021-01-15

## 2021-08-31 ENCOUNTER — DOCUMENTATION ONLY (OUTPATIENT)
Dept: LAB | Facility: CLINIC | Age: 76
End: 2021-08-31

## 2021-08-31 DIAGNOSIS — Z13.6 CARDIOVASCULAR SCREENING; LDL GOAL LESS THAN 130: Primary | ICD-10-CM

## 2021-08-31 DIAGNOSIS — Z00.00 ROUTINE HISTORY AND PHYSICAL EXAMINATION OF ADULT: ICD-10-CM

## 2021-08-31 DIAGNOSIS — Z11.59 NEED FOR HEPATITIS C SCREENING TEST: ICD-10-CM

## 2021-09-05 ENCOUNTER — HEALTH MAINTENANCE LETTER (OUTPATIENT)
Age: 76
End: 2021-09-05

## 2021-09-07 ENCOUNTER — LAB (OUTPATIENT)
Dept: LAB | Facility: CLINIC | Age: 76
End: 2021-09-07
Payer: MEDICARE

## 2021-09-07 DIAGNOSIS — Z13.6 CARDIOVASCULAR SCREENING; LDL GOAL LESS THAN 130: ICD-10-CM

## 2021-09-07 DIAGNOSIS — Z11.59 NEED FOR HEPATITIS C SCREENING TEST: ICD-10-CM

## 2021-09-07 DIAGNOSIS — Z00.00 ROUTINE HISTORY AND PHYSICAL EXAMINATION OF ADULT: ICD-10-CM

## 2021-09-07 LAB
CHOLEST SERPL-MCNC: 133 MG/DL
ERYTHROCYTE [DISTWIDTH] IN BLOOD BY AUTOMATED COUNT: 13.6 % (ref 10–15)
FASTING STATUS PATIENT QL REPORTED: YES
FASTING STATUS PATIENT QL REPORTED: YES
GLUCOSE BLD-MCNC: 99 MG/DL (ref 70–99)
HCT VFR BLD AUTO: 47.2 % (ref 40–53)
HCV AB SERPL QL IA: NONREACTIVE
HDLC SERPL-MCNC: 76 MG/DL
HGB BLD-MCNC: 16.2 G/DL (ref 13.3–17.7)
LDLC SERPL CALC-MCNC: 41 MG/DL
MCH RBC QN AUTO: 34 PG (ref 26.5–33)
MCHC RBC AUTO-ENTMCNC: 34.3 G/DL (ref 31.5–36.5)
MCV RBC AUTO: 99 FL (ref 78–100)
NONHDLC SERPL-MCNC: 57 MG/DL
PLATELET # BLD AUTO: 185 10E3/UL (ref 150–450)
RBC # BLD AUTO: 4.76 10E6/UL (ref 4.4–5.9)
TRIGL SERPL-MCNC: 78 MG/DL
WBC # BLD AUTO: 8.7 10E3/UL (ref 4–11)

## 2021-09-07 PROCEDURE — 86803 HEPATITIS C AB TEST: CPT

## 2021-09-07 PROCEDURE — 82947 ASSAY GLUCOSE BLOOD QUANT: CPT

## 2021-09-07 PROCEDURE — 85027 COMPLETE CBC AUTOMATED: CPT

## 2021-09-07 PROCEDURE — 36415 COLL VENOUS BLD VENIPUNCTURE: CPT

## 2021-09-07 PROCEDURE — 80061 LIPID PANEL: CPT

## 2021-09-07 ASSESSMENT — ENCOUNTER SYMPTOMS
DIZZINESS: 0
NAUSEA: 0
JOINT SWELLING: 0
HEMATURIA: 0
CONSTIPATION: 0
WEAKNESS: 0
FREQUENCY: 0
FEVER: 0
ARTHRALGIAS: 0
DIARRHEA: 0
EYE PAIN: 0
DYSURIA: 0
SHORTNESS OF BREATH: 0
HEADACHES: 0
NERVOUS/ANXIOUS: 0
ABDOMINAL PAIN: 0
HEMATOCHEZIA: 0
SORE THROAT: 0
COUGH: 0
PALPITATIONS: 0
HEARTBURN: 0
MYALGIAS: 0
CHILLS: 0
PARESTHESIAS: 0

## 2021-09-07 ASSESSMENT — ACTIVITIES OF DAILY LIVING (ADL): CURRENT_FUNCTION: NO ASSISTANCE NEEDED

## 2021-09-08 ENCOUNTER — OFFICE VISIT (OUTPATIENT)
Dept: FAMILY MEDICINE | Facility: CLINIC | Age: 76
End: 2021-09-08
Payer: MEDICARE

## 2021-09-08 VITALS
HEART RATE: 42 BPM | WEIGHT: 165 LBS | OXYGEN SATURATION: 100 % | DIASTOLIC BLOOD PRESSURE: 74 MMHG | SYSTOLIC BLOOD PRESSURE: 144 MMHG | BODY MASS INDEX: 25.01 KG/M2 | HEIGHT: 68 IN | TEMPERATURE: 97.4 F

## 2021-09-08 DIAGNOSIS — Z85.828 HISTORY OF SKIN CANCER: ICD-10-CM

## 2021-09-08 DIAGNOSIS — R00.1 SINUS BRADYCARDIA: ICD-10-CM

## 2021-09-08 DIAGNOSIS — Z00.00 ENCOUNTER FOR MEDICARE ANNUAL WELLNESS EXAM: Primary | ICD-10-CM

## 2021-09-08 DIAGNOSIS — I25.10 CORONARY ARTERY DISEASE INVOLVING NATIVE CORONARY ARTERY OF NATIVE HEART WITHOUT ANGINA PECTORIS: ICD-10-CM

## 2021-09-08 DIAGNOSIS — E78.5 HYPERLIPIDEMIA LDL GOAL <70: ICD-10-CM

## 2021-09-08 PROBLEM — Z13.6 CARDIOVASCULAR SCREENING; LDL GOAL LESS THAN 130: Status: RESOLVED | Noted: 2020-08-18 | Resolved: 2021-09-08

## 2021-09-08 PROCEDURE — G0439 PPPS, SUBSEQ VISIT: HCPCS | Performed by: FAMILY MEDICINE

## 2021-09-08 RX ORDER — NITROGLYCERIN 0.4 MG/1
TABLET SUBLINGUAL PRN
COMMUNITY
Start: 2020-12-15

## 2021-09-08 RX ORDER — ATORVASTATIN CALCIUM 80 MG/1
1 TABLET, FILM COATED ORAL DAILY
COMMUNITY
Start: 2021-08-30

## 2021-09-08 RX ORDER — ASPIRIN 81 MG/1
TABLET, CHEWABLE ORAL DAILY
COMMUNITY
Start: 2020-12-14

## 2021-09-08 RX ORDER — TICAGRELOR 90 MG/1
1 TABLET ORAL 2 TIMES DAILY
COMMUNITY
Start: 2021-06-29 | End: 2023-07-21

## 2021-09-08 ASSESSMENT — ENCOUNTER SYMPTOMS
CHILLS: 0
COUGH: 0
FREQUENCY: 0
HEMATURIA: 0
DIARRHEA: 0
ABDOMINAL PAIN: 0
CONSTIPATION: 0
PARESTHESIAS: 0
HEARTBURN: 0
HEMATOCHEZIA: 0
EYE PAIN: 0
NAUSEA: 0
DYSURIA: 0
JOINT SWELLING: 0
NERVOUS/ANXIOUS: 0
PALPITATIONS: 0
SHORTNESS OF BREATH: 0
MYALGIAS: 0
HEADACHES: 0
DIZZINESS: 0
SORE THROAT: 0
ARTHRALGIAS: 0
FEVER: 0
WEAKNESS: 0

## 2021-09-08 ASSESSMENT — MIFFLIN-ST. JEOR: SCORE: 1454.69

## 2021-09-08 ASSESSMENT — ACTIVITIES OF DAILY LIVING (ADL): CURRENT_FUNCTION: NO ASSISTANCE NEEDED

## 2021-09-08 NOTE — PROGRESS NOTES
"SUBJECTIVE:   Aurelio Hassan is a 75 year old male who presents for a Preventive Visit.  He spends about 7 months in [place] and was there last December when he developed chest pain and diaphoresis and went into the ER.  He apparently had an MI and had a coronary angiogram with 2 cardiac stents placed.  He is now on low-dose aspirin, atorvastatin, and Brilinta.  He apparently had some type of cardiac monitoring device placed internally at that time, but it was not an ICD or a pacemaker.  He generally feels well now and is back to playing tennis on a regular basis.    Patient has been advised of split billing requirements and indicates understanding: Yes   Are you in the first 12 months of your Medicare coverage?  No    Healthy Habits:     In general, how would you rate your overall health?  Excellent    Frequency of exercise:  4-5 days/week    Duration of exercise:  45-60 minutes    Do you usually eat at least 4 servings of fruit and vegetables a day, include whole grains    & fiber and avoid regularly eating high fat or \"junk\" foods?  Yes    Taking medications regularly:  Yes    Medication side effects:  Not applicable    Ability to successfully perform activities of daily living:  No assistance needed    Home Safety:  No safety concerns identified    Hearing Impairment:  Difficulty following a conversation in a noisy restaurant or crowded room, feel that people are mumbling or not speaking clearly, need to ask people to speak up or repeat themselves, difficulty understanding soft or whispered speech and difficulty understanding speech on the telephone    In the past 6 months, have you been bothered by leaking of urine?  No    In general, how would you rate your overall mental or emotional health?  Excellent      PHQ-2 Total Score: 0    Additional concerns today:  Yes    Do you feel safe in your environment? Yes    Have you ever done Advance Care Planning? (For example, a Health Directive, POLST, or a discussion " with a medical provider or your loved ones about your wishes): No, advance care planning information given to patient to review.  Advanced care planning was discussed at today's visit.       Fall risk  Fallen 2 or more times in the past year?: No  Any fall with injury in the past year?: No    Cognitive Screening   1) Repeat 3 items (Leader, Season, Table)    2) Clock draw: NORMAL  3) 3 item recall: Recalls 3 objects  Results: 3 items recalled: COGNITIVE IMPAIRMENT LESS LIKELY    Mini-CogTM Copyright S rBian. Licensed by the author for use in [facility]; reprinted with permission (elio@[facility]). All rights reserved.      Do you have sleep apnea, excessive snoring or daytime drowsiness?: no    Reviewed and updated as needed this visit by clinical staff  Tobacco  Allergies  Meds   Med Hx  Surg Hx  Fam Hx  Soc Hx        Reviewed and updated as needed this visit by Provider                Social History     Tobacco Use     Smoking status: Former Smoker     Quit date: 1970     Years since quittin.7     Smokeless tobacco: Never Used   Substance Use Topics     Alcohol use: Yes     Comment: 2 drinks daily          Alcohol Use 2021   Prescreen: >3 drinks/day or >7 drinks/week? No   Prescreen: >3 drinks/day or >7 drinks/week? -       Other concerns:     Times when he doesn't sleep well at night and other nights he sleeps good. Wondering about taking Tylenol PM.  Cardiologist in [facility]. Who should he see if needed?    Current providers sharing in care for this patient include:   Patient Care Team:  Win Black MD as PCP - General (Family Practice)  Win Black MD as Assigned PCP    The following health maintenance items are reviewed in Epic and correct as of today:  Health Maintenance Due   Topic Date Due     ANNUAL REVIEW OF HM ORDERS  Never done     COVID-19 Vaccine (1) Never done     EYE EXAM  2017     ZOSTER IMMUNIZATION (2 of 2) 2020     INFLUENZA VACCINE  (1) 2021     FALL RISK ASSESSMENT  2021     Patient Active Problem List   Diagnosis     Nuclear sclerosis of both eyes     Posterior vitreous detachment of right eye     Pterygium of left eye     History of hip replacement, total, right     History of skin cancer     Sinus bradycardia     Hyperlipidemia LDL goal <70     Coronary artery disease involving native coronary artery of native heart without angina pectoris     Past Surgical History:   Procedure Laterality Date     APPENDECTOMY       ENT SURGERY      Tonsilectomy     ORTHOPEDIC SURGERY  2017    Right Hip       Social History     Tobacco Use     Smoking status: Former Smoker     Quit date: 1970     Years since quittin.7     Smokeless tobacco: Never Used   Substance Use Topics     Alcohol use: Yes     Comment: 2 drinks daily      History reviewed. No pertinent family history.      Current Outpatient Medications   Medication Sig Dispense Refill     aspirin (ASA) 81 MG chewable tablet daily       atorvastatin (LIPITOR) 80 MG tablet 1 tablet daily       BRILINTA 90 MG tablet 1 tablet 2 times daily       nitroGLYcerin (NITROSTAT) 0.4 MG sublingual tablet as needed       No Known Allergies        Review of Systems   Constitutional: Negative for chills and fever.   HENT: Positive for hearing loss. Negative for congestion, ear pain and sore throat.    Eyes: Negative for pain and visual disturbance.   Respiratory: Negative for cough and shortness of breath.    Cardiovascular: Negative for chest pain, palpitations and peripheral edema.   Gastrointestinal: Negative for abdominal pain, constipation, diarrhea, heartburn, hematochezia and nausea.   Genitourinary: Negative for discharge, dysuria, frequency, genital sores, hematuria, impotence and urgency.   Musculoskeletal: Negative for arthralgias, joint swelling and myalgias.   Skin: Negative for rash.   Neurological: Negative for dizziness, weakness, headaches and paresthesias.  "  Psychiatric/Behavioral: Negative for mood changes. The patient is not nervous/anxious.          OBJECTIVE:   BP (!) 144/74 (BP Location: Right arm, Patient Position: Sitting, Cuff Size: Adult Regular)   Pulse (!) 42   Temp 97.4  F (36.3  C) (Oral)   Ht 1.73 m (5' 8.11\")   Wt 74.8 kg (165 lb)   SpO2 100%   BMI 25.01 kg/m   Estimated body mass index is 25.01 kg/m  as calculated from the following:    Height as of this encounter: 1.73 m (5' 8.11\").    Weight as of this encounter: 74.8 kg (165 lb).  Physical Exam  GENERAL: healthy, alert and no distress  EYES: Eyes grossly normal to inspection, PERRL and conjunctivae and sclerae normal  HENT: Grossly normal  NECK: Supple  RESP: lungs clear to auscultation - no rales, rhonchi or wheezes  CV: Bradycardic regular rate and rhythm, normal S1 S2, no S3 or S4, no murmur, click or rub, no peripheral edema and peripheral pulses intact  ABDOMEN: soft, nontender, no hepatosplenomegaly, no masses   MS: no gross musculoskeletal defects noted, no edema  SKIN: no suspicious lesions or rashes  NEURO: Normal strength and tone, mentation intact and speech normal  PSYCH: mentation appears normal, affect normal/bright    Diagnostic Test Results:  Labs reviewed in Epic  Lab on 09/07/2021   Component Date Value Ref Range Status     WBC Count 09/07/2021 8.7  4.0 - 11.0 10e3/uL Final     RBC Count 09/07/2021 4.76  4.40 - 5.90 10e6/uL Final     Hemoglobin 09/07/2021 16.2  13.3 - 17.7 g/dL Final     Hematocrit 09/07/2021 47.2  40.0 - 53.0 % Final     MCV 09/07/2021 99  78 - 100 fL Final     MCH 09/07/2021 34.0* 26.5 - 33.0 pg Final     MCHC 09/07/2021 34.3  31.5 - 36.5 g/dL Final     RDW 09/07/2021 13.6  10.0 - 15.0 % Final     Platelet Count 09/07/2021 185  150 - 450 10e3/uL Final     Cholesterol 09/07/2021 133  <200 mg/dL Final    Age 0-19 years  Desirable: <170 mg/dL  Borderline high:  170-199 mg/dl  High:            >199 mg/dl    Age 20 years and older  Desirable: <200 mg/dL     " Triglycerides 09/07/2021 78  <150 mg/dL Final    0-9 years:  Normal:    Less than 75 mg/dL  Borderline high:  75-99 mg/dL  High:             Greater than or equal to 100 mg/dL    0-19 years:  Normal:    Less than 90 mg/dL  Borderline high:   mg/dL  High:             Greater than or equal to 130 mg/dL    20 years and older:  Normal:    Less than 150 mg/dL  Borderline high:  150-199 mg/dL  High:             200-499 mg/dL  Very high:   Greater than or equal to 500 mg/dL     Direct Measure HDL 09/07/2021 76  >=40 mg/dL Final    0-19 years:       Greater than or equal to 45 mg/dL   Low: Less than 40 mg/dL   Borderline low: 40-44 mg/dL     20 years and older:   Female: Greater than or equal to 50 mg/dL   Male:   Greater than or equal to 40 mg/dL          LDL Cholesterol Calculated 09/07/2021 41  <=100 mg/dL Final    Age 0-19 years:  Desirable: 0-110 mg/dL   Borderline high: 110-129 mg/dL   High: >= 130 mg/dL    Age 20 years and older:  Desirable: <100mg/dL  Above desirable: 100-129 mg/dL   Borderline high: 130-159 mg/dL   High: 160-189 mg/dL   Very high: >= 190 mg/dL     Non HDL Cholesterol 09/07/2021 57  <130 mg/dL Final    0-19 years:  Desirable:          Less than 120 mg/dL  Borderline high:   120-144 mg/dL  High:                   Greater than or equal to 145 mg/dL    20 years and older:  Desirable:          130 mg/dL  Above Desirable: 130-159 mg/dL  Borderline high:   160-189 mg/dL  High:               190-219 mg/dL  Very high:     Greater than or equal to 220 mg/dL     Patient Fasting > 8hrs? 09/07/2021 Yes   Final     Glucose 09/07/2021 99  70 - 99 mg/dL Final     Patient Fasting > 8hrs? 09/07/2021 Yes   Final     Hepatitis C Antibody 09/07/2021 Nonreactive  Nonreactive Final         ASSESSMENT / PLAN:       ICD-10-CM    1. Encounter for Medicare annual wellness exam  Z00.00    2. Hyperlipidemia LDL goal <70  E78.5    3. Coronary artery disease involving native coronary artery of native heart without angina  "pectoris  I25.10    4. Sinus bradycardia  R00.1    5. History of skin cancer  Z85.828      Blood pressure is up slightly today, but acceptable  We discussed the other items above  We will continue his same baseline meds  He will continue routine cardiology and dermatology follow-up care in [place] as per their recommendations  Discussed that he could use Tylenol PM on an as-needed basis to help with sleep and we could connect him with a local [facility] cardiologist if needed/desired in the future  He anticipates that he will eventually live in [place] year-round  Plan a tentative recheck in 1 year, or sooner prn    Patient has been advised of split billing requirements and indicates understanding: Yes  COUNSELING:  Reviewed preventive health counseling, as reflected in patient instructions       Regular exercise       Healthy diet/nutrition    Estimated body mass index is 25.01 kg/m  as calculated from the following:    Height as of this encounter: 1.73 m (5' 8.11\").    Weight as of this encounter: 74.8 kg (165 lb).        He reports that he quit smoking about 51 years ago. He has never used smokeless tobacco.      Appropriate preventive services were discussed with this patient, including applicable screening as appropriate for cardiovascular disease, diabetes, osteopenia/osteoporosis, and glaucoma.  As appropriate for age/gender, discussed screening for colorectal cancer, prostate cancer, breast cancer, and cervical cancer. Checklist reviewing preventive services available has been given to the patient.    Reviewed patients plan of care and provided an AVS. The Basic Care Plan (routine screening as documented in Health Maintenance) for Aurelio meets the Care Plan requirement. This Care Plan has been established and reviewed with the Patient.    Counseling Resources:  ATP IV Guidelines  Pooled Cohorts Equation Calculator  Breast Cancer Risk Calculator  Breast Cancer: Medication to Reduce Risk  FRAX Risk " Assessment  ICSI Preventive Guidelines  Dietary Guidelines for Americans, 2010  USDA's MyPlate  ASA Prophylaxis  Lung CA Screening    Win Black MD  [facility]    Identified Health Risks:

## 2021-09-29 ENCOUNTER — OFFICE VISIT (OUTPATIENT)
Dept: OPHTHALMOLOGY | Facility: CLINIC | Age: 76
End: 2021-09-29
Payer: MEDICARE

## 2021-09-29 DIAGNOSIS — H52.4 HYPEROPIA OF BOTH EYES WITH REGULAR ASTIGMATISM AND PRESBYOPIA: ICD-10-CM

## 2021-09-29 DIAGNOSIS — H52.03 HYPEROPIA OF BOTH EYES WITH REGULAR ASTIGMATISM AND PRESBYOPIA: ICD-10-CM

## 2021-09-29 DIAGNOSIS — H43.812 POSTERIOR VITREOUS DETACHMENT OF LEFT EYE: ICD-10-CM

## 2021-09-29 DIAGNOSIS — H25.813 COMBINED FORMS OF AGE-RELATED CATARACT OF BOTH EYES: ICD-10-CM

## 2021-09-29 DIAGNOSIS — H52.223 HYPEROPIA OF BOTH EYES WITH REGULAR ASTIGMATISM AND PRESBYOPIA: ICD-10-CM

## 2021-09-29 DIAGNOSIS — Z01.00 EXAMINATION OF EYES AND VISION: Primary | ICD-10-CM

## 2021-09-29 PROCEDURE — 92004 COMPRE OPH EXAM NEW PT 1/>: CPT | Performed by: STUDENT IN AN ORGANIZED HEALTH CARE EDUCATION/TRAINING PROGRAM

## 2021-09-29 PROCEDURE — 92015 DETERMINE REFRACTIVE STATE: CPT | Mod: GY | Performed by: STUDENT IN AN ORGANIZED HEALTH CARE EDUCATION/TRAINING PROGRAM

## 2021-09-29 ASSESSMENT — CONF VISUAL FIELD
OD_NORMAL: 1
METHOD: COUNTING FINGERS
OS_NORMAL: 1

## 2021-09-29 ASSESSMENT — CUP TO DISC RATIO
OD_RATIO: 0.2
OS_RATIO: 0.2

## 2021-09-29 ASSESSMENT — REFRACTION_MANIFEST
OD_SPHERE: +0.75
OD_CYLINDER: +0.75
OS_CYLINDER: +1.25
OS_ADD: +2.50
OS_AXIS: 005
OS_SPHERE: +0.50
OD_ADD: +2.50
OD_AXIS: 003

## 2021-09-29 ASSESSMENT — REFRACTION_WEARINGRX
OS_CYLINDER: +0.50
SPECS_TYPE: PAL
OS_ADD: +1.25
OS_SPHERE: +1.25
OD_ADD: +1.25
OD_SPHERE: +1.25
OD_AXIS: 002
OS_AXIS: 010
OD_CYLINDER: +0.75

## 2021-09-29 ASSESSMENT — SLIT LAMP EXAM - LIDS
COMMENTS: 2+ DERMATOCHALASIS
COMMENTS: 2+ DERMATOCHALASIS

## 2021-09-29 ASSESSMENT — TONOMETRY
OD_IOP_MMHG: 20
OS_IOP_MMHG: 19
IOP_METHOD: APPLANATION

## 2021-09-29 ASSESSMENT — VISUAL ACUITY
METHOD: SNELLEN - LINEAR
OD_BAT_HIGH: 20/50-2
OS_CC: 20/30
OS_BAT_MED: 20/40-1
OD_BAT_LOW: 20/40
OD_CC+: -1
CORRECTION_TYPE: GLASSES
OS_CC+: -2
OS_BAT_LOW: 20/30-1
OD_CC: 20/40
OS_BAT_HIGH: 20/40-1
OD_BAT_MED: 20/40-1

## 2021-09-29 ASSESSMENT — EXTERNAL EXAM - RIGHT EYE: OD_EXAM: NORMAL

## 2021-09-29 ASSESSMENT — EXTERNAL EXAM - LEFT EYE: OS_EXAM: NORMAL

## 2021-09-29 NOTE — PROGRESS NOTES
Current Eye Medications:  None     Subjective:  Complete eye exam. Vision is doing fine in the distance both eyes. Having trouble reading small print in paper. No eye pain or discomfort in either eye.        Objective:  See Ophthalmology Exam.      Assessment:  Aurelio Hassan is a 75 year old male who presents with:   Encounter Diagnoses   Name Primary?     Examination of eyes and vision      Hyperopia of both eyes with regular astigmatism and presbyopia        Combined forms of age-related cataract of both eyes Not bothersome to patient at present     Posterior vitreous detachment of left eye        Plan:  Glasses prescription given     Ernesto Xiong MD  [phone]

## 2021-09-29 NOTE — LETTER
9/29/2021         RE: Aurelio Hassan  [address]  [facility] [address]        Dear Colleague,    Thank you for referring your patient, Aurelio Hassan, to the [facility]. Please see a copy of my visit note below.     Current Eye Medications:  None     Subjective:  Complete eye exam. Vision is doing fine in the distance both eyes. Having trouble reading small print in paper. No eye pain or discomfort in either eye.        Objective:  See Ophthalmology Exam.      Assessment:  Aurelio Hassan is a 75 year old male who presents with:   Encounter Diagnoses   Name Primary?     Examination of eyes and vision      Hyperopia of both eyes with regular astigmatism and presbyopia        Combined forms of age-related cataract of both eyes Not bothersome to patient at present     Posterior vitreous detachment of left eye        Plan:  Glasses prescription given     Ernesto Xiong MD  [phone]                 Again, thank you for allowing me to participate in the care of your patient.        Sincerely,        Ernesto Xiong MD

## 2022-08-23 ENCOUNTER — MYC MEDICAL ADVICE (OUTPATIENT)
Dept: FAMILY MEDICINE | Facility: CLINIC | Age: 77
End: 2022-08-23

## 2022-08-23 DIAGNOSIS — E78.5 HYPERLIPIDEMIA LDL GOAL <70: Primary | ICD-10-CM

## 2022-08-23 DIAGNOSIS — I25.10 CORONARY ARTERY DISEASE INVOLVING NATIVE CORONARY ARTERY OF NATIVE HEART WITHOUT ANGINA PECTORIS: ICD-10-CM

## 2022-08-24 NOTE — TELEPHONE ENCOUNTER
Routing to provider to advise. Pt has order signed for HMPO, but no labs due. Per chart review, pt had glucose, lipids (not due until 2026 per health maintenance) and CBC with platelets on 9/7/21.    Gabriela Woods RN  [facility]

## 2022-08-30 ENCOUNTER — LAB (OUTPATIENT)
Dept: LAB | Facility: CLINIC | Age: 77
End: 2022-08-30
Payer: MEDICARE

## 2022-08-30 DIAGNOSIS — E78.5 HYPERLIPIDEMIA LDL GOAL <70: ICD-10-CM

## 2022-08-30 DIAGNOSIS — I25.10 CORONARY ARTERY DISEASE INVOLVING NATIVE CORONARY ARTERY OF NATIVE HEART WITHOUT ANGINA PECTORIS: ICD-10-CM

## 2022-08-30 LAB
ALT SERPL W P-5'-P-CCNC: 30 U/L (ref 0–70)
ANION GAP SERPL CALCULATED.3IONS-SCNC: 4 MMOL/L (ref 3–14)
BUN SERPL-MCNC: 16 MG/DL (ref 7–30)
CALCIUM SERPL-MCNC: 8.7 MG/DL (ref 8.5–10.1)
CHLORIDE BLD-SCNC: 106 MMOL/L (ref 94–109)
CHOLEST SERPL-MCNC: 125 MG/DL
CO2 SERPL-SCNC: 28 MMOL/L (ref 20–32)
CREAT SERPL-MCNC: 1.03 MG/DL (ref 0.66–1.25)
ERYTHROCYTE [DISTWIDTH] IN BLOOD BY AUTOMATED COUNT: 14 % (ref 10–15)
FASTING STATUS PATIENT QL REPORTED: YES
GFR SERPL CREATININE-BSD FRML MDRD: 75 ML/MIN/1.73M2
GLUCOSE BLD-MCNC: 99 MG/DL (ref 70–99)
HCT VFR BLD AUTO: 48.5 % (ref 40–53)
HDLC SERPL-MCNC: 69 MG/DL
HGB BLD-MCNC: 15.8 G/DL (ref 13.3–17.7)
LDLC SERPL CALC-MCNC: 39 MG/DL
MCH RBC QN AUTO: 33.4 PG (ref 26.5–33)
MCHC RBC AUTO-ENTMCNC: 32.6 G/DL (ref 31.5–36.5)
MCV RBC AUTO: 103 FL (ref 78–100)
NONHDLC SERPL-MCNC: 56 MG/DL
PLATELET # BLD AUTO: 180 10E3/UL (ref 150–450)
POTASSIUM BLD-SCNC: 4.3 MMOL/L (ref 3.4–5.3)
RBC # BLD AUTO: 4.73 10E6/UL (ref 4.4–5.9)
SODIUM SERPL-SCNC: 138 MMOL/L (ref 133–144)
TRIGL SERPL-MCNC: 85 MG/DL
WBC # BLD AUTO: 6.5 10E3/UL (ref 4–11)

## 2022-08-30 PROCEDURE — 80048 BASIC METABOLIC PNL TOTAL CA: CPT

## 2022-08-30 PROCEDURE — 84460 ALANINE AMINO (ALT) (SGPT): CPT

## 2022-08-30 PROCEDURE — 85027 COMPLETE CBC AUTOMATED: CPT

## 2022-08-30 PROCEDURE — 36415 COLL VENOUS BLD VENIPUNCTURE: CPT

## 2022-08-30 PROCEDURE — 80061 LIPID PANEL: CPT

## 2022-09-08 ASSESSMENT — ENCOUNTER SYMPTOMS
PALPITATIONS: 0
FREQUENCY: 1
HEMATURIA: 0
SORE THROAT: 0
ARTHRALGIAS: 0
JOINT SWELLING: 0
HEADACHES: 0
WEAKNESS: 0
NAUSEA: 0
SHORTNESS OF BREATH: 0
HEMATOCHEZIA: 0
FEVER: 0
NERVOUS/ANXIOUS: 0
HEARTBURN: 0
EYE PAIN: 0
MYALGIAS: 0
DYSURIA: 0
COUGH: 0
ABDOMINAL PAIN: 0
CHILLS: 0
DIARRHEA: 0
PARESTHESIAS: 0
CONSTIPATION: 0
DIZZINESS: 0

## 2022-09-08 ASSESSMENT — ACTIVITIES OF DAILY LIVING (ADL): CURRENT_FUNCTION: NO ASSISTANCE NEEDED

## 2022-09-12 ENCOUNTER — OFFICE VISIT (OUTPATIENT)
Dept: FAMILY MEDICINE | Facility: CLINIC | Age: 77
End: 2022-09-12
Payer: MEDICARE

## 2022-09-12 VITALS
OXYGEN SATURATION: 99 % | TEMPERATURE: 98 F | HEIGHT: 68 IN | WEIGHT: 167 LBS | BODY MASS INDEX: 25.31 KG/M2 | HEART RATE: 49 BPM | SYSTOLIC BLOOD PRESSURE: 115 MMHG | DIASTOLIC BLOOD PRESSURE: 63 MMHG

## 2022-09-12 DIAGNOSIS — E78.5 HYPERLIPIDEMIA LDL GOAL <70: ICD-10-CM

## 2022-09-12 DIAGNOSIS — Z00.00 ENCOUNTER FOR MEDICARE ANNUAL WELLNESS EXAM: Primary | ICD-10-CM

## 2022-09-12 DIAGNOSIS — I25.10 CORONARY ARTERY DISEASE INVOLVING NATIVE CORONARY ARTERY OF NATIVE HEART WITHOUT ANGINA PECTORIS: ICD-10-CM

## 2022-09-12 DIAGNOSIS — R00.1 SINUS BRADYCARDIA: ICD-10-CM

## 2022-09-12 DIAGNOSIS — Z85.828 HISTORY OF SKIN CANCER: ICD-10-CM

## 2022-09-12 PROCEDURE — 99213 OFFICE O/P EST LOW 20 MIN: CPT | Mod: 25 | Performed by: FAMILY MEDICINE

## 2022-09-12 PROCEDURE — G0439 PPPS, SUBSEQ VISIT: HCPCS | Performed by: FAMILY MEDICINE

## 2022-09-12 ASSESSMENT — ENCOUNTER SYMPTOMS
WEAKNESS: 0
ABDOMINAL PAIN: 0
PARESTHESIAS: 0
NERVOUS/ANXIOUS: 0
ARTHRALGIAS: 0
SORE THROAT: 0
CONSTIPATION: 0
EYE PAIN: 0
HEADACHES: 0
SHORTNESS OF BREATH: 0
PALPITATIONS: 0
NAUSEA: 0
FEVER: 0
JOINT SWELLING: 0
FREQUENCY: 1
DYSURIA: 0
HEMATOCHEZIA: 0
HEARTBURN: 0
MYALGIAS: 0
HEMATURIA: 0
COUGH: 0
CHILLS: 0
DIARRHEA: 0
DIZZINESS: 0

## 2022-09-12 ASSESSMENT — ACTIVITIES OF DAILY LIVING (ADL): CURRENT_FUNCTION: NO ASSISTANCE NEEDED

## 2022-09-12 ASSESSMENT — PAIN SCALES - GENERAL: PAINLEVEL: NO PAIN (0)

## 2022-09-12 NOTE — PROGRESS NOTES
"SUBJECTIVE:   Aurelio Hassan is a 76 year old male who presents for Preventive Visit.    {Split Bill scripting  The purpose of this visit is to discuss your medical history and prevent health problems before you are sick. You may be responsible for a co-pay, coinsurance, or deductible if your visit today includes services such as checking on a sore throat, having an x-ray or lab test, or treating and evaluating a new or existing condition :141720}  {Patient advised of split billing (Optional):739940}  Are you in the first 12 months of your Medicare coverage?  { :279854::\"No\"}    Well Child  Pertinent negatives include no abdominal pain, arthralgias, chest pain, chills, congestion, coughing, fever, headaches, joint swelling, myalgias, nausea, rash, sore throat or weakness.   Healthy Habits:     In general, how would you rate your overall health?  Excellent    Frequency of exercise:  4-5 days/week    Duration of exercise:  45-60 minutes    Do you usually eat at least 4 servings of fruit and vegetables a day, include whole grains    & fiber and avoid regularly eating high fat or \"junk\" foods?  No    Taking medications regularly:  Yes    Medication side effects:  None, No muscle aches and No significant flushing    Ability to successfully perform activities of daily living:  No assistance needed    Home Safety:  No safety concerns identified    Hearing Impairment:  Difficulty following a conversation in a noisy restaurant or crowded room, feel that people are mumbling or not speaking clearly and difficulty understanding soft or whispered speech    In the past 6 months, have you been bothered by leaking of urine?  No    In general, how would you rate your overall mental or emotional health?  Excellent      PHQ-2 Total Score: 0    Additional concerns today:  No    Do you feel safe in your environment? { :576207}    Have you ever done Advance Care Planning? (For example, a Health Directive, POLST, or a discussion with a " "medical provider or your loved ones about your wishes): { :618323}    {Hearing Test Done (Optional):633299}   Fall risk  { :261231}  {If any of the above assessments are answered yes, consider ordering appropriate referrals (Optional):485884::\"click delete button to remove this line now\"}  Cognitive Screening { :801563}    {Do you have sleep apnea, excessive snoring or daytime drowsiness? (Optional):364307}    Reviewed and updated as needed this visit by clinical staff                    Reviewed and updated as needed this visit by Provider                   Social History     Tobacco Use     Smoking status: Former Smoker     Quit date: 1970     Years since quittin.7     Smokeless tobacco: Never Used   Substance Use Topics     Alcohol use: Yes     Comment: 2 drinks daily      {Rooming Staff- Complete this question if Prescreen response is not shown below for today's visit. If you drink alcohol do you typically have >3 drinks per day or >7 drinks per week? (Optional):602091}    Alcohol Use 2022   Prescreen: >3 drinks/day or >7 drinks/week? No   Prescreen: >3 drinks/day or >7 drinks/week? -   {add AUDIT responses (Optional) (A score of 7 for adult men is an indication of hazardous drinking; a score of 8 or more is an indication of an alcohol use disorder.  A score of 7 or more for adult women is an indication of hazardous drinking or an alchohol use disorder):875208}    {Outside tests to abstract? :949152}    {additional problems to add (Optional):808316}    Current providers sharing in care for this patient include: {Rooming staff:  Please update Care Team in Rooming Activity, refresh this note and then delete this statement}  Patient Care Team:  Win Black MD as PCP - General (Family Practice)  Win Black MD as Assigned PCP  Ernesto Xiong MD as Assigned Surgical Provider    The following health maintenance items are reviewed in Epic and correct as of today:  Health Maintenance Due " "  Topic Date Due     ZOSTER IMMUNIZATION (2 of 2) 2020     COVID-19 Vaccine (3 - Booster for Moderna series) 2021     INFLUENZA VACCINE (1) 2022     ANNUAL REVIEW OF HM ORDERS  2022     MEDICARE ANNUAL WELLNESS VISIT  2022     EYE EXAM  2022     {Chronicprobdata (optional):612766}  {Decision Support (Optional):851829}        Review of Systems   Constitutional: Negative for chills and fever.   HENT: Negative for congestion, ear pain, hearing loss and sore throat.    Eyes: Negative for pain and visual disturbance.   Respiratory: Negative for cough and shortness of breath.    Cardiovascular: Negative for chest pain, palpitations and peripheral edema.   Gastrointestinal: Negative for abdominal pain, constipation, diarrhea, heartburn, hematochezia and nausea.   Genitourinary: Positive for frequency. Negative for dysuria, genital sores, hematuria, impotence, penile discharge and urgency.   Musculoskeletal: Negative for arthralgias, joint swelling and myalgias.   Skin: Negative for rash.   Neurological: Negative for dizziness, weakness, headaches and paresthesias.   Psychiatric/Behavioral: Negative for mood changes. The patient is not nervous/anxious.      {ROS COMP (Optional):331862}    OBJECTIVE:   There were no vitals taken for this visit. Estimated body mass index is 25.01 kg/m  as calculated from the following:    Height as of 21: 1.73 m (5' 8.11\").    Weight as of 21: 74.8 kg (165 lb).  Physical Exam  {Exam (Optional) :698697}    {Diagnostic Test Results (Optional):518289::\"Diagnostic Test Results:\",\"Labs reviewed in Epic\"}    ASSESSMENT / PLAN:   {Diag Picklist:673491}    {Patient advised of split billing (Optional):115937}    COUNSELING:  {Medicare Counselin}    Estimated body mass index is 25.01 kg/m  as calculated from the following:    Height as of 21: 1.73 m (5' 8.11\").    Weight as of 21: 74.8 kg (165 lb).    {Weight Management Plan (ACO) Complete if " BMI is abnormal-  Ages 18-64  BMI >24.9.  Age 65+ with BMI <23 or >30 (Optional):873771}    He reports that he quit smoking about 52 years ago. He has never used smokeless tobacco.      Appropriate preventive services were discussed with this patient, including applicable screening as appropriate for cardiovascular disease, diabetes, osteopenia/osteoporosis, and glaucoma.  As appropriate for age/gender, discussed screening for colorectal cancer, prostate cancer, breast cancer, and cervical cancer. Checklist reviewing preventive services available has been given to the patient.    Reviewed patients plan of care and provided an AVS. The {CarePlan:357768} for Aurelio meets the Care Plan requirement. This Care Plan has been established and reviewed with the {PATIENT, FAMILY MEMBER, CAREGIVER:880624}.    Counseling Resources:  ATP IV Guidelines  Pooled Cohorts Equation Calculator  Breast Cancer Risk Calculator  Breast Cancer: Medication to Reduce Risk  FRAX Risk Assessment  ICSI Preventive Guidelines  Dietary Guidelines for Americans, 2010  [employer]'s MyPlate  ASA Prophylaxis  Lung CA Screening    Win Black MD  [facility]    Identified Health Risks:

## 2022-09-12 NOTE — PROGRESS NOTES
"SUBJECTIVE:   Aurelio Hassan is a 76 year old male who presents for a Preventive Visit and follow-up on some baseline health conditions.      Patient has been advised of split billing requirements and indicates understanding: Yes  Are you in the first 12 months of your Medicare coverage?      Healthy Habits:     In general, how would you rate your overall health?  Excellent    Frequency of exercise:  4-5 days/week    Duration of exercise:  45-60 minutes    Do you usually eat at least 4 servings of fruit and vegetables a day, include whole grains    & fiber and avoid regularly eating high fat or \"junk\" foods?  No    Taking medications regularly:  Yes    Medication side effects:  None, No muscle aches and No significant flushing    Ability to successfully perform activities of daily living:  No assistance needed    Home Safety:  No safety concerns identified    Hearing Impairment:  Difficulty following a conversation in a noisy restaurant or crowded room, feel that people are mumbling or not speaking clearly and difficulty understanding soft or whispered speech    In the past 6 months, have you been bothered by leaking of urine?  No    In general, how would you rate your overall mental or emotional health?  Excellent      PHQ-2 Total Score: 0    Additional concerns today:  No    Do you feel safe in your environment? Yes    Have you ever done Advance Care Planning? (For example, a Health Directive, POLST, or a discussion with a medical provider or your loved ones about your wishes): No, advance care planning information given to patient to review.  Patient declined advance care planning discussion at this time.       Fall risk  Fallen 2 or more times in the past year?: No  Any fall with injury in the past year?: No    Cognitive Screening   1) Repeat 3 items (Leader, Season, Table)    2) Clock draw: NORMAL  3) 3 item recall: Recalls 3 objects  Results: NORMAL clock, 1-2 items recalled: COGNITIVE IMPAIRMENT LESS " LIKELY    Mini-CogTM Copyright STEPH Torres. Licensed by the author for use in [facility]; reprinted with permission ([email or web address]). All rights reserved.      Do you have sleep apnea, excessive snoring or daytime drowsiness?: no    Reviewed and updated as needed this visit by clinical staff      Problems               Reviewed and updated as needed this visit by Provider      Problems              Social History     Tobacco Use     Smoking status: Former Smoker     Quit date: 1970     Years since quittin.7     Smokeless tobacco: Never Used   Substance Use Topics     Alcohol use: Yes     Comment: 2 drinks daily      If you drink alcohol do you typically have >3 drinks per day or >7 drinks per week? No    Alcohol Use 2022   Prescreen: >3 drinks/day or >7 drinks/week? No   Prescreen: >3 drinks/day or >7 drinks/week? -     He remains physically active by playing tennis for 5 days a week.  He does not have any angina with that.  He is on baseline medications as below.  He came in recently for fasting lab work.  He will be heading down to [place] in early October for the winter months.  He will be seeing his dermatologist and cardiologist in the next few months down in [place].    Current providers sharing in care for this patient include:   Patient Care Team:  Win Black MD as PCP - General (Family Practice)  Win Black MD as Assigned PCP  Ernesto Xiong MD as Assigned Surgical Provider    The following health maintenance items are reviewed in Epic and correct as of today:  Health Maintenance Due   Topic Date Due     ZOSTER IMMUNIZATION (2 of 2) 2020     COVID-19 Vaccine (3 - Booster for Moderna series) 2021     INFLUENZA VACCINE (1) 2022     ANNUAL REVIEW OF HM ORDERS  2022     MEDICARE ANNUAL WELLNESS VISIT  2022     EYE EXAM  2022     Patient Active Problem List   Diagnosis     Nuclear sclerosis of both eyes     Posterior vitreous detachment  of right eye     Pterygium of left eye     History of hip replacement, total, right     History of skin cancer     Sinus bradycardia     Hyperlipidemia LDL goal <70     Coronary artery disease involving native coronary artery of native heart without angina pectoris     Past Surgical History:   Procedure Laterality Date     APPENDECTOMY       ENT SURGERY      Tonsilectomy     ORTHOPEDIC SURGERY  2017    Right Hip       Social History     Tobacco Use     Smoking status: Former Smoker     Quit date: 1970     Years since quittin.7     Smokeless tobacco: Never Used   Substance Use Topics     Alcohol use: Yes     Comment: 2 drinks daily      Family History   Problem Relation Age of Onset     Glaucoma No family hx of      Macular Degeneration No family hx of          Current Outpatient Medications   Medication Sig Dispense Refill     aspirin (ASA) 81 MG chewable tablet daily       atorvastatin (LIPITOR) 80 MG tablet 1 tablet daily       BRILINTA 90 MG tablet 1 tablet 2 times daily       nitroGLYcerin (NITROSTAT) 0.4 MG sublingual tablet as needed (Patient not taking: Reported on 2022)       No Known Allergies      Review of Systems   Constitutional: Negative for chills and fever.   HENT: Negative for congestion, ear pain, hearing loss and sore throat.    Eyes: Negative for pain and visual disturbance.   Respiratory: Negative for cough and shortness of breath.    Cardiovascular: Negative for chest pain, palpitations and peripheral edema.   Gastrointestinal: Negative for abdominal pain, constipation, diarrhea, heartburn, hematochezia and nausea.   Genitourinary: Positive for frequency. Negative for dysuria, genital sores, hematuria, impotence, penile discharge and urgency.   Musculoskeletal: Negative for arthralgias, joint swelling and myalgias.   Skin: Negative for rash.   Neurological: Negative for dizziness, weakness, headaches and paresthesias.   Psychiatric/Behavioral: Negative for mood changes. The  "patient is not nervous/anxious.          OBJECTIVE:   /63 (BP Location: Right arm, Patient Position: Sitting, Cuff Size: Adult Regular)   Pulse (!) 49   Temp 98  F (36.7  C) (Oral)   Ht 1.727 m (5' 8\")   Wt 75.8 kg (167 lb)   SpO2 99%   BMI 25.39 kg/m   Estimated body mass index is 25.39 kg/m  as calculated from the following:    Height as of this encounter: 1.727 m (5' 8\").    Weight as of this encounter: 75.8 kg (167 lb).  Physical Exam  GENERAL: healthy, alert and no distress  EYES: Eyes grossly normal to inspection, PERRL and conjunctivae and sclerae normal  HENT: Grossly normal  NECK: no adenopathy, no asymmetry, masses, or scars and thyroid normal to palpation  RESP: lungs clear to auscultation - no rales, rhonchi or wheezes  CV: Mildly bradycardic, regular rate and rhythm, normal S1 S2, no S3 or S4, no murmur, click or rub, no peripheral edema   ABDOMEN: soft, nontender, no hepatosplenomegaly, no masses and bowel sounds normal  MS: no gross musculoskeletal defects noted, no edema  SKIN: no suspicious lesions or rashes  NEURO: Normal strength and tone, mentation intact and speech normal  PSYCH: mentation appears normal, affect normal/bright    Diagnostic Test Results:  Labs reviewed in Epic  Lab on 08/30/2022   Component Date Value Ref Range Status     WBC Count 08/30/2022 6.5  4.0 - 11.0 10e3/uL Final     RBC Count 08/30/2022 4.73  4.40 - 5.90 10e6/uL Final     Hemoglobin 08/30/2022 15.8  13.3 - 17.7 g/dL Final     Hematocrit 08/30/2022 48.5  40.0 - 53.0 % Final     MCV 08/30/2022 103 (A) 78 - 100 fL Final     MCH 08/30/2022 33.4 (A) 26.5 - 33.0 pg Final     MCHC 08/30/2022 32.6  31.5 - 36.5 g/dL Final     RDW 08/30/2022 14.0  10.0 - 15.0 % Final     Platelet Count 08/30/2022 180  150 - 450 10e3/uL Final     ALT 08/30/2022 30  0 - 70 U/L Final     Cholesterol 08/30/2022 125  <200 mg/dL Final     Triglycerides 08/30/2022 85  <150 mg/dL Final     Direct Measure HDL 08/30/2022 69  >=40 mg/dL Final "     LDL Cholesterol Calculated 08/30/2022 39  <=100 mg/dL Final     Non HDL Cholesterol 08/30/2022 56  <130 mg/dL Final     Patient Fasting > 8hrs? 08/30/2022 Yes   Final     Sodium 08/30/2022 138  133 - 144 mmol/L Final     Potassium 08/30/2022 4.3  3.4 - 5.3 mmol/L Final     Chloride 08/30/2022 106  94 - 109 mmol/L Final     Carbon Dioxide (CO2) 08/30/2022 28  20 - 32 mmol/L Final     Anion Gap 08/30/2022 4  3 - 14 mmol/L Final     Urea Nitrogen 08/30/2022 16  7 - 30 mg/dL Final     Creatinine 08/30/2022 1.03  0.66 - 1.25 mg/dL Final     Calcium 08/30/2022 8.7  8.5 - 10.1 mg/dL Final     Glucose 08/30/2022 99  70 - 99 mg/dL Final     GFR Estimate 08/30/2022 75  >60 mL/min/1.73m2 Final    Effective December 21, 2021 eGFRcr in adults is calculated using the 2021 CKD-EPI creatinine equation which includes age and gender (Bobbi et al., NEJM, DOI: 10.1056/SISJye8922118)         ASSESSMENT / PLAN:       ICD-10-CM    1. Encounter for Medicare annual wellness exam  Z00.00    2. Sinus bradycardia  R00.1    3. Hyperlipidemia LDL goal <70  E78.5    4. Coronary artery disease involving native coronary artery of native heart without angina pectoris  I25.10    5. History of skin cancer  Z85.828      His heart rate remains on the low side, but this has been longstanding and he follows with cardiology for that  His lab results look nice and healthy and I reviewed those with him  He will continue his same medications  He will follow-up with dermatology and cardiology in the next few months in [place] as planned  I reviewed his vaccine status with him and it sounds like he has up-to-date on shingles and COVID vaccines, although we do not have all of those dates in our system  I advised him to get a flu shot and the new bivalent COVID vaccine booster in a month or so  Plan a tentative recheck in 1 year, or sooner prn    COUNSELING:  Reviewed preventive health counseling, as reflected in patient instructions       Regular exercise     "   Healthy diet/nutrition    Estimated body mass index is 25.39 kg/m  as calculated from the following:    Height as of this encounter: 1.727 m (5' 8\").    Weight as of this encounter: 75.8 kg (167 lb).        He reports that he quit smoking about 52 years ago. He has never used smokeless tobacco.      Appropriate preventive services were discussed with this patient, including applicable screening as appropriate for cardiovascular disease, diabetes, osteopenia/osteoporosis, and glaucoma.  As appropriate for age/gender, discussed screening for colorectal cancer, prostate cancer, breast cancer, and cervical cancer. Checklist reviewing preventive services available has been given to the patient.    Reviewed patients plan of care and provided an AVS. The Basic Care Plan (routine screening as documented in Health Maintenance) for Aurelio meets the Care Plan requirement. This Care Plan has been established and reviewed with the Patient.    Counseling Resources:  ATP IV Guidelines  Pooled Cohorts Equation Calculator  Breast Cancer Risk Calculator  Breast Cancer: Medication to Reduce Risk  FRAX Risk Assessment  ICSI Preventive Guidelines  Dietary Guidelines for Americans, 2010  USDA's MyPlate  ASA Prophylaxis  Lung CA Screening    Win Black MD  [facility]    Identified Health Risks:  "

## 2022-09-12 NOTE — PATIENT INSTRUCTIONS
Patient Education   Personalized Prevention Plan  You are due for the preventive services outlined below.  Your care team is available to assist you in scheduling these services.  If you have already completed any of these items, please share that information with your care team to update in your medical record.  Health Maintenance Due   Topic Date Due     Zoster (Shingles) Vaccine (2 of 2) 04/14/2020     COVID-19 Vaccine (3 - Booster for Moderna series) 07/04/2021     Flu Vaccine (1) 09/01/2022     ANNUAL REVIEW OF HM ORDERS  09/08/2022     Eye Exam  09/29/2022       Understanding USDA MyPlate  The USDA has guidelines to help you make healthy food choices. These are called MyPlate. MyPlate shows the food groups that make up healthy meals using the image of a place setting. Before you eat, think about the healthiest choices for what to put on your plate or in your cup or bowl. To learn more about building a healthy plate, visit www.choosemyplate.gov.    The food groups    Fruits. Any fruit or 100% fruit juice counts as part of the Fruit Group. Fruits may be fresh, canned, frozen, or dried, and may be whole, cut-up, or pureed. Make 1/2 of your plate fruits and vegetables.    Vegetables. Any vegetable or 100% vegetable juice counts as a member of the Vegetable Group. Vegetables may be fresh, frozen, canned, or dried. They can be served raw or cooked and may be whole, cut-up, or mashed. Make 1/2 of your plate fruits and vegetables.    Grains. All foods made from grains are part of the Grains Group. These include wheat, rice, oats, cornmeal, and barley. Grains are often used to make foods such as bread, pasta, oatmeal, cereal, tortillas, and grits. Grains should be no more than 1/4 of your plate. At least half of your grains should be whole grains.    Protein. This group includes meat, poultry, seafood, beans and peas, eggs, processed soy products (such as tofu), nuts (including nut butters), and seeds. Make protein  choices no more than 1/4 of your plate. Meat and poultry choices should be lean or low fat.    Dairy. The Dairy Group includes all fluid milk products and foods made from milk that contain calcium, such as yogurt and cheese. (Foods that have little calcium, such as cream, butter, and cream cheese, are not part of this group.) Most dairy choices should be low-fat or fat-free.    Oils. Oils aren't a food group, but they do contain essential nutrients. However it's important to watch your intake of oils. These are fats that are liquid at room temperature. They include canola, corn, olive, soybean, vegetable, and sunflower oil. Foods that are mainly oil include mayonnaise, certain salad dressings, and soft margarines. You likely already get your daily oil allowance from the foods you eat.  Things to limit  Eating healthy also means limiting these things in your diet:       Salt (sodium). Many processed foods have a lot of sodium. To keep sodium intake down, eat fresh vegetables, meats, poultry, and seafood when possible. Purchase low-sodium, reduced-sodium, or no-salt-added food products at the store. And don't add salt to your meals at home. Instead, season them with herbs and spices such as dill, oregano, cumin, and paprika. Or try adding flavor with lemon or lime zest and juice.    Saturated fat. Saturated fats are most often found in animal products such as beef, pork, and chicken. They are often solid at room temperature, such as butter. To reduce your saturated fat intake, choose leaner cuts of meat and poultry. And try healthier cooking methods such as grilling, broiling, roasting, or baking. For a simple lower-fat swap, use plain nonfat yogurt instead of mayonnaise when making potato salad or macaroni salad.    Added sugars. These are sugars added to foods. They are in foods such as ice cream, candy, soda, fruit drinks, sports drinks, energy drinks, cookies, pastries, jams, and syrups. Cut down on added sugars  by sharing sweet treats with a family member or friend. You can also choose fruit for dessert, and drink water or other unsweetened beverages.     StayWell last reviewed this educational content on 6/1/2020 2000-2021 [employer]. All rights reserved. This information is not intended as a substitute for professional medical care. Always follow your healthcare professional's instructions.          Signs of Hearing Loss      Hearing much better with one ear can be a sign of hearing loss.   Hearing loss is a problem shared by many people. In fact, it is one of the most common health problems, particularly as people age. Most people age 65 and older have some hearing loss. By age 80, almost everyone does. Hearing loss often occurs slowly over the years. So you may not realize your hearing has gotten worse.  Have your hearing checked  Call your healthcare provider if you:    Have to strain to hear normal conversation    Have to watch other people s faces very carefully to follow what they re saying    Need to ask people to repeat what they ve said    Often misunderstand what people are saying    Turn the volume of the television or radio up so high that others complain    Feel that people are mumbling when they re talking to you    Find that the effort to hear leaves you feeling tired and irritated    Notice, when using the phone, that you hear better with one ear than the other  [employer] last reviewed this educational content on 1/1/2020 2000-2021 [employer]. All rights reserved. This information is not intended as a substitute for professional medical care. Always follow your healthcare professional's instructions.

## 2022-09-12 NOTE — PROGRESS NOTES
"    The patient was counseled and encouraged to consider modifying their diet and eating habits. He was provided with information on recommended healthy diet options.  The patient was provided with written information regarding signs of hearing loss.  Answers for HPI/ROS submitted by the patient on 9/8/2022  In general, how would you rate your overall physical health?: excellent  Frequency of exercise:: 4-5 days/week  Do you usually eat at least 4 servings of fruit and vegetables a day, include whole grains & fiber, and avoid regularly eating high fat or \"junk\" foods? : No  Taking medications regularly:: Yes  Medication side effects:: None, No muscle aches, No significant flushing  Activities of Daily Living: no assistance needed  Home safety: no safety concerns identified  Hearing Impairment:: difficulty following a conversation in a noisy restaurant or crowded room, feel that people are mumbling or not speaking clearly, difficulty understanding soft or whispered speech  In the past 6 months, have you been bothered by leaking of urine?: No  abdominal pain: No  Blood in stool: No  Blood in urine: No  chest pain: No  chills: No  congestion: No  constipation: No  cough: No  diarrhea: No  dizziness: No  ear pain: No  eye pain: No  nervous/anxious: No  fever: No  frequency: Yes  genital sores: No  headaches: No  hearing loss: No  heartburn: No  arthralgias: No  joint swelling: No  peripheral edema: No  mood changes: No  myalgias: No  nausea: No  dysuria: No  palpitations: No  Skin sensation changes: No  sore throat: No  urgency: No  rash: No  shortness of breath: No  visual disturbance: No  weakness: No  impotence: No  penile discharge: No  In general, how would you rate your overall mental or emotional health?: excellent  Additional concerns today:: No  Duration of exercise:: 45-60 minutes      "

## 2022-10-20 ENCOUNTER — MYC MEDICAL ADVICE (OUTPATIENT)
Dept: FAMILY MEDICINE | Facility: CLINIC | Age: 77
End: 2022-10-20

## 2022-10-22 ENCOUNTER — HEALTH MAINTENANCE LETTER (OUTPATIENT)
Age: 77
End: 2022-10-22

## 2022-10-24 NOTE — TELEPHONE ENCOUNTER
Asked pt to send us the covid vaccine information. Once done please update chart.    Jyothi COREAS MA

## 2022-10-24 NOTE — TELEPHONE ENCOUNTER
Updated immunization record. Unable to update one covid vaccine due to date being incorrect. Let pt know that we are unable to get date and contact pharmacy to get fix mistake. Once done to have him send us a new copy of card information.    Jyothi COREAS MA

## 2023-02-23 ENCOUNTER — TRANSFERRED RECORDS (OUTPATIENT)
Dept: HEALTH INFORMATION MANAGEMENT | Facility: CLINIC | Age: 78
End: 2023-02-23

## 2023-02-23 LAB
CHOLESTEROL (EXTERNAL): 121 MG/DL (ref 107–240)
HDLC SERPL-MCNC: 69 MG/DL (ref 35–67)
LDL CHOLESTEROL CALCULATED (EXTERNAL): 35 MG/DL
TRIGLYCERIDES (EXTERNAL): 85 MG/DL (ref 40–199)

## 2023-02-28 ENCOUNTER — TRANSFERRED RECORDS (OUTPATIENT)
Dept: HEALTH INFORMATION MANAGEMENT | Facility: CLINIC | Age: 78
End: 2023-02-28

## 2023-02-28 LAB — EJECTION FRACTION: NORMAL %

## 2023-03-01 ENCOUNTER — TRANSFERRED RECORDS (OUTPATIENT)
Dept: HEALTH INFORMATION MANAGEMENT | Facility: CLINIC | Age: 78
End: 2023-03-01

## 2023-04-25 ENCOUNTER — TRANSFERRED RECORDS (OUTPATIENT)
Dept: HEALTH INFORMATION MANAGEMENT | Facility: CLINIC | Age: 78
End: 2023-04-25
Payer: MEDICARE

## 2023-05-09 ENCOUNTER — TRANSFERRED RECORDS (OUTPATIENT)
Dept: HEALTH INFORMATION MANAGEMENT | Facility: CLINIC | Age: 78
End: 2023-05-09
Payer: MEDICARE

## 2023-05-15 ENCOUNTER — TELEPHONE (OUTPATIENT)
Dept: FAMILY MEDICINE | Facility: CLINIC | Age: 78
End: 2023-05-15
Payer: MEDICARE

## 2023-05-15 DIAGNOSIS — E78.5 HYPERLIPIDEMIA LDL GOAL <70: Primary | ICD-10-CM

## 2023-05-15 DIAGNOSIS — I25.10 CORONARY ARTERY DISEASE INVOLVING NATIVE CORONARY ARTERY OF NATIVE HEART WITHOUT ANGINA PECTORIS: ICD-10-CM

## 2023-05-15 NOTE — TELEPHONE ENCOUNTER
Called patient and relayed information, assisted patient in making lab only appointment for fasting labs on 9/13/23.    MARISSA MartinezN RN  [facility]

## 2023-05-15 NOTE — TELEPHONE ENCOUNTER
"Lab orders have been placed.  He can schedule a fasting \"lab only\" appointment for a few days to a week prior to his annual wellness exam.    Win Black MD    "

## 2023-05-15 NOTE — TELEPHONE ENCOUNTER
Patient's spouse called in to make an Annual appointment for him to visit with PCP, Dr. Black in 09/23. She stated that patient would like to have labs done prior to the visit by a few days. Could you please have Dr. Black add lab orders to Epic and call patient with confirmation/scheduling.    The best phone number is: [phone]. It is okay to leave a detailed message if he doesn't answer.    Thank you!

## 2023-05-15 NOTE — TELEPHONE ENCOUNTER
Patient requesting having lab draw week prior to AWV on 9/15/23.     Routing to PCP to please place lab orders needed. HM orders will be past due.    Please route back to team to assist patient in scheduling lab appointment.    Thanks,  JAY Martinez RN  [facility]

## 2023-05-16 ENCOUNTER — TRANSCRIBE ORDERS (OUTPATIENT)
Dept: OTHER | Age: 78
End: 2023-05-16

## 2023-05-16 DIAGNOSIS — I49.5 SICK SINUS SYNDROME (H): Primary | ICD-10-CM

## 2023-06-07 ENCOUNTER — ANCILLARY PROCEDURE (OUTPATIENT)
Dept: CARDIOLOGY | Facility: CLINIC | Age: 78
End: 2023-06-07
Attending: INTERNAL MEDICINE
Payer: MEDICARE

## 2023-06-07 VITALS
WEIGHT: 172.4 LBS | HEIGHT: 69 IN | HEART RATE: 37 BPM | SYSTOLIC BLOOD PRESSURE: 130 MMHG | OXYGEN SATURATION: 100 % | BODY MASS INDEX: 25.53 KG/M2 | DIASTOLIC BLOOD PRESSURE: 71 MMHG

## 2023-06-07 DIAGNOSIS — I49.5 SICK SINUS SYNDROME (H): ICD-10-CM

## 2023-06-07 DIAGNOSIS — Z95.818 IMPLANTABLE LOOP RECORDER PRESENT: ICD-10-CM

## 2023-06-07 PROCEDURE — G0463 HOSPITAL OUTPT CLINIC VISIT: HCPCS | Performed by: INTERNAL MEDICINE

## 2023-06-07 PROCEDURE — 93010 ELECTROCARDIOGRAM REPORT: CPT | Performed by: INTERNAL MEDICINE

## 2023-06-07 PROCEDURE — 99205 OFFICE O/P NEW HI 60 MIN: CPT | Mod: 25 | Performed by: INTERNAL MEDICINE

## 2023-06-07 PROCEDURE — 93005 ELECTROCARDIOGRAM TRACING: CPT

## 2023-06-07 RX ORDER — TICAGRELOR 60 MG/1
1 TABLET ORAL
COMMUNITY
Start: 2023-05-01

## 2023-06-07 ASSESSMENT — PAIN SCALES - GENERAL: PAINLEVEL: NO PAIN (0)

## 2023-06-07 NOTE — PATIENT INSTRUCTIONS
Plan:    Consider pacemaker and let us know.  Remote transmission from [facility] in August      Your Care Team:  EP Cardiology   Telephone Number     Ange Santiago RN [phone]    After business hours: [phone], ask for cardiologist on-call   Non-procedure scheduling:    Marisela BURK   [phone]   Procedure scheduling:    Shaylee Rogers   [phone]   Device Clinic (Pacemakers, ICDs, Loop Recorders)    During business hours: [phone]  After business hours:   [phone]- select option 4 and ask for job code [phone].       Cardiovascular Clinic:   [address] [place] [address]. [place] [address]      As always, thank you for trusting us with your health care needs!

## 2023-06-07 NOTE — PROGRESS NOTES
ELECTROPHYSIOLOGY CLINIC VISIT    Assessment/Recommendations   Assessment/Plan:    Mr. Hassan is a 77 year old male who has a medical history significant for CAD s/p PCI to LM, ILR implanted in 2020 for bradycardia, presenting to \[facility]\"" care.    CAD  Bradycardia, Sinus Pauses:  1. Antiplatlet: Chronic brilinta d/t LM PCI  2. Statin: Lipitor  3. BB:  Not currently on due to bradycardia and pauses. Discussed does have indication for BB given CAD and could be indication for PPM implant.   4.  Nitroglycerin 0.4 mg PRN for chest pain. Place 1 tablet (0.4 mg) under the tongue every 5 minutes as needed for chest pain. Maximum of 3 doses in 15 minutes.  5. Lifestyle: Avoid tobacco, maintain a healthy weight, limit alcohol, cardiac diet, and exercise.  6. Bradycardia/Pauses: Has 1 AVB and SB on ECG. ILR has shown pauses > 3 seconds that are asymptomatic. No immediate pacing need, but discussed has higher risk of needing PPM in future. Also discussed indication for BB and could be indication for pacing. We discussed with the patient the rationale for PPM  placement, alternative therapies,  technical aspects of the surgical procedure, risks/benefits of therapy and need for long-term follow-up in the Device Clinic.  The risk of pacemaker placement include: over sedation, reaction to local anesthetic, reaction to narcotics or benzodiazipines used for moderate secation, localized bleeding, internal bleeding, collapsed lung, and acute or late infections. There is the possibilty of unforseen complications as well such as device or lead failure, lead dislodgement.  All question/concerns were addressed. After our discussion, the patient verbalized understanding and wishes to proceed to think about PPM and will let us know. We asked him to send remote ILR transsion in 8/2023 before going back to [place].     Follow up annually or sooner if need arises.        History of Present Illness/Subjective    Mr. Aurelio Hassan is a 77 year  "old male who comes in today for EP consultation of AVB.    Mr. Hassan is a 77 year old male who has a medical history significant for CAD s/p PCI to LM, ILR implanted in 2020 for bradycardia, presenting to establish care.    He has a history of CAD and had PCI to LM. He had a Ziopatch 8/24-9/6/2020 showing 2 episodes of second degree AVB, symptoms not timed, otherwise no arrhythmias. He had an ILR implanted for close monitoring. He has had 45 pauses >3 seconds, 23 from January to May 2023 and 4 May into June 2023 noted on ILR. Last echo from OSH in 2/2023 reported normal LVEF, trace MR, and trace TR.     He reports feeling well. He plays tennis 3-4 times/week and is very active without limitations. He denies chest discomfort, palpitations, abdominal fullness/bloating or peripheral edema, shortness of breath, paroxysmal nocturnal dyspnea, orthopnea, lightheadedness, dizziness, pre-syncope, or syncope. Presenting 12 lead ECG shows SB 1AVB Vent Rate 38 bpm,  ms, QRS 96 ms, QTc 484 ms. Device interrogation shows pause episodes noted as above, normal ILR function. Current cardiac medications include: Brilinta, Lipitor, and ASA.         I have reviewed and updated the patient's Past Medical History, Social History, Family History and Medication List.     Cardiographics (Personally Reviewed) :   Echo OSH Feb 2023:   LVEF 55-60%, trace MR, trace TR.    TTE 2017:   Final Conclusion    1. Normal left ventricular chamber size and systolic function. Estimated left ventricular ejection fraction is 65-70%.    2. No regional wall motion abnormalities. Normal left ventricular wall thickness.    3. Normal right ventricular size and systolic function.    4. No significant valvular heart disease.          Physical Examination   /71 (BP Location: Right arm, Patient Position: Supine, Cuff Size: Adult Regular)   Pulse (!) 37   Ht 1.756 m (5' 9.13\")   Wt 78.2 kg (172 lb 6.4 oz)   SpO2 100%   BMI 25.36 kg/m    Wt Readings " from Last 3 Encounters:   09/12/22 75.8 kg (167 lb)   09/08/21 74.8 kg (165 lb)   09/11/20 74.8 kg (165 lb)       CONSITUTIONAL: no acute distress  HEENT: no icterus, no redness or discharge, neck supple  CV: no visible edema of visualized extremities. No JVD.   RESPIRATORY: respirations nonlabored, no cough  NEURO: AA&Ox3, speech fluent/appropriate, motor grossly nonfocal  PSYCH: cooperative, affect appropriate  DERM: no rashes on visualized face/neck/upper extremities         Medications  Allergies   Current Outpatient Medications   Medication Sig Dispense Refill     aspirin (ASA) 81 MG chewable tablet daily       atorvastatin (LIPITOR) 80 MG tablet 1 tablet daily       BRILINTA 90 MG tablet 1 tablet 2 times daily       nitroGLYcerin (NITROSTAT) 0.4 MG sublingual tablet as needed (Patient not taking: Reported on 9/12/2022)      No Known Allergies      Lab Results (Personally Reviewed)    Chemistry/lipid CBC Cardiac Enzymes/BNP/TSH/INR   Lab Results   Component Value Date    BUN 16 08/30/2022     08/30/2022    CO2 28 08/30/2022     Creatinine   Date Value Ref Range Status   08/30/2022 1.03 0.66 - 1.25 mg/dL Final   08/18/2020 1.04 0.66 - 1.25 mg/dL Final       Lab Results   Component Value Date    CHOL 125 08/30/2022    HDL 69 08/30/2022    LDL 39 08/30/2022      Lab Results   Component Value Date    WBC 6.5 08/30/2022    HGB 15.8 08/30/2022    HCT 48.5 08/30/2022     (H) 08/30/2022     08/30/2022    No results found for: CKTOTAL, CKMB, TROPONINI, BNP, TSH, INR     The patient states understanding and is agreeable with the plan.   Cade Marroquin MD [facility]  Cardiology - Electrophysiology      Total time spent on patient visit, reviewing notes, imaging, labs, orders, and completing necessary documentation: 60 minutes.

## 2023-06-07 NOTE — NURSING NOTE
Chief Complaint   Patient presents with     New Patient     New EP- Establish care -  ILR implant 8/2020. Junctional escape beats, undersensed by ILR       Vitals were taken, medications reconciled, and EKG was performed.    Beto Alfredo, SHARI  9:07 AM

## 2023-06-07 NOTE — LETTER
6/7/2023      RE: Aurelio Hassan  [address]  [place] [address]       Dear Colleague,    Thank you for the opportunity to participate in the care of your patient, Aurelio Hassan, at the [facility] [place] [facility]. Please see a copy of my visit note below.        ELECTROPHYSIOLOGY CLINIC VISIT    Assessment/Recommendations   Assessment/Plan:    Mr. Hassan is a 77 year old male who has a medical history significant for CAD s/p PCI to LM, ILR implanted in 2020 for bradycardia, presenting to establish care.    CAD  Bradycardia, Sinus Pauses:  1. Antiplatlet: Chronic brilinta d/t LM PCI  2. Statin: Lipitor  3. BB:  Not currently on due to bradycardia and pauses. Discussed does have indication for BB given CAD and could be indication for PPM implant.   4.  Nitroglycerin 0.4 mg PRN for chest pain. Place 1 tablet (0.4 mg) under the tongue every 5 minutes as needed for chest pain. Maximum of 3 doses in 15 minutes.  5. Lifestyle: Avoid tobacco, maintain a healthy weight, limit alcohol, cardiac diet, and exercise.  6. Bradycardia/Pauses: Has 1 AVB and SB on ECG. ILR has shown pauses > 3 seconds that are asymptomatic. No immediate pacing need, but discussed has higher risk of needing PPM in future. Also discussed indication for BB and could be indication for pacing. We discussed with the patient the rationale for PPM  placement, alternative therapies,  technical aspects of the surgical procedure, risks/benefits of therapy and need for long-term follow-up in the Device Clinic.  The risk of pacemaker placement include: over sedation, reaction to local anesthetic, reaction to narcotics or benzodiazipines used for moderate secation, localized bleeding, internal bleeding, collapsed lung, and acute or late infections. There is the possibilty of unforseen complications as well such as device or lead failure, lead dislodgement.  All question/concerns  were addressed. After our discussion, the patient verbalized understanding and wishes to proceed to think about PPM and will let us know. We asked him to send remote ILR transsion in 8/2023 before going back to [place].     Follow up annually or sooner if need arises.        History of Present Illness/Subjective    Mr. Aurelio Hassan is a 77 year old male who comes in today for EP consultation of AVB.    Mr. Hassan is a 77 year old male who has a medical history significant for CAD s/p PCI to LM, ILR implanted in 2020 for bradycardia, presenting to establish care.    He has a history of CAD and had PCI to LM. He had a Ziopatch 8/24-9/6/2020 showing 2 episodes of second degree AVB, symptoms not timed, otherwise no arrhythmias. He had an ILR implanted for close monitoring. He has had 45 pauses >3 seconds, 23 from January to May 2023 and 4 May into June 2023 noted on ILR. Last echo from OSH in 2/2023 reported normal LVEF, trace MR, and trace TR.     He reports feeling well. He plays tennis 3-4 times/week and is very active without limitations. He denies chest discomfort, palpitations, abdominal fullness/bloating or peripheral edema, shortness of breath, paroxysmal nocturnal dyspnea, orthopnea, lightheadedness, dizziness, pre-syncope, or syncope. Presenting 12 lead ECG shows SB 1AVB Vent Rate 38 bpm,  ms, QRS 96 ms, QTc 484 ms. Device interrogation shows pause episodes noted as above, normal ILR function. Current cardiac medications include: Brilinta, Lipitor, and ASA.         I have reviewed and updated the patient's Past Medical History, Social History, Family History and Medication List.     Cardiographics (Personally Reviewed) :   Echo OSH Feb 2023:   LVEF 55-60%, trace MR, trace TR.    TTE 2017:   Final Conclusion    1. Normal left ventricular chamber size and systolic function. Estimated left ventricular ejection fraction is 65-70%.    2. No regional wall motion abnormalities. Normal left ventricular wall  "thickness.    3. Normal right ventricular size and systolic function.    4. No significant valvular heart disease.          Physical Examination   /71 (BP Location: Right arm, Patient Position: Supine, Cuff Size: Adult Regular)   Pulse (!) 37   Ht 1.756 m (5' 9.13\")   Wt 78.2 kg (172 lb 6.4 oz)   SpO2 100%   BMI 25.36 kg/m    Wt Readings from Last 3 Encounters:   09/12/22 75.8 kg (167 lb)   09/08/21 74.8 kg (165 lb)   09/11/20 74.8 kg (165 lb)       CONSITUTIONAL: no acute distress  HEENT: no icterus, no redness or discharge, neck supple  CV: no visible edema of visualized extremities. No JVD.   RESPIRATORY: respirations nonlabored, no cough  NEURO: AA&Ox3, speech fluent/appropriate, motor grossly nonfocal  PSYCH: cooperative, affect appropriate  DERM: no rashes on visualized face/neck/upper extremities         Medications  Allergies   Current Outpatient Medications   Medication Sig Dispense Refill    aspirin (ASA) 81 MG chewable tablet daily      atorvastatin (LIPITOR) 80 MG tablet 1 tablet daily      BRILINTA 90 MG tablet 1 tablet 2 times daily      nitroGLYcerin (NITROSTAT) 0.4 MG sublingual tablet as needed (Patient not taking: Reported on 9/12/2022)      No Known Allergies      Lab Results (Personally Reviewed)    Chemistry/lipid CBC Cardiac Enzymes/BNP/TSH/INR   Lab Results   Component Value Date    BUN 16 08/30/2022     08/30/2022    CO2 28 08/30/2022     Creatinine   Date Value Ref Range Status   08/30/2022 1.03 0.66 - 1.25 mg/dL Final   08/18/2020 1.04 0.66 - 1.25 mg/dL Final       Lab Results   Component Value Date    CHOL 125 08/30/2022    HDL 69 08/30/2022    LDL 39 08/30/2022      Lab Results   Component Value Date    WBC 6.5 08/30/2022    HGB 15.8 08/30/2022    HCT 48.5 08/30/2022     (H) 08/30/2022     08/30/2022    No results found for: CKTOTAL, CKMB, TROPONINI, BNP, TSH, INR     The patient states understanding and is agreeable with the plan.   Cade Marroquin MD [facility]  Cardiology - Electrophysiology      Total time spent on patient visit, reviewing notes, imaging, labs, orders, and completing necessary documentation: 60 minutes.                      Please do not hesitate to contact me if you have any questions/concerns.     Sincerely,     Cade Marroquin MD

## 2023-06-08 LAB
ATRIAL RATE - MUSE: 38 BPM
DIASTOLIC BLOOD PRESSURE - MUSE: NORMAL MMHG
INTERPRETATION ECG - MUSE: NORMAL
P AXIS - MUSE: 100 DEGREES
PR INTERVAL - MUSE: 294 MS
QRS DURATION - MUSE: 96 MS
QT - MUSE: 484 MS
QTC - MUSE: 384 MS
R AXIS - MUSE: 21 DEGREES
SYSTOLIC BLOOD PRESSURE - MUSE: NORMAL MMHG
T AXIS - MUSE: 32 DEGREES
VENTRICULAR RATE- MUSE: 38 BPM

## 2023-06-10 LAB
MDC_IDC_EPISODE_DTM: NORMAL
MDC_IDC_EPISODE_DURATION: 120 S
MDC_IDC_EPISODE_DURATION: 240 S
MDC_IDC_EPISODE_DURATION: 240 S
MDC_IDC_EPISODE_DURATION: 360 S
MDC_IDC_EPISODE_ID: NORMAL
MDC_IDC_EPISODE_TYPE: NORMAL
MDC_IDC_MSMT_BATTERY_DTM: NORMAL
MDC_IDC_MSMT_BATTERY_STATUS: NORMAL
MDC_IDC_PG_IMPLANT_DTM: NORMAL
MDC_IDC_PG_MFG: NORMAL
MDC_IDC_PG_MODEL: NORMAL
MDC_IDC_PG_SERIAL: NORMAL
MDC_IDC_PG_TYPE: NORMAL
MDC_IDC_SESS_CLINIC_NAME: NORMAL
MDC_IDC_SESS_DTM: NORMAL
MDC_IDC_SESS_TYPE: NORMAL
MDC_IDC_STAT_AT_BURDEN_PERCENT: 1 %
MDC_IDC_STAT_AT_DTM_END: NORMAL
MDC_IDC_STAT_AT_DTM_START: NORMAL

## 2023-07-05 DIAGNOSIS — I49.5 SICK SINUS SYNDROME (H): Primary | ICD-10-CM

## 2023-07-05 RX ORDER — LIDOCAINE 40 MG/G
CREAM TOPICAL
Status: CANCELLED | OUTPATIENT
Start: 2023-07-05

## 2023-07-05 RX ORDER — CEFAZOLIN SODIUM 2 G/50ML
2 SOLUTION INTRAVENOUS
Status: CANCELLED | OUTPATIENT
Start: 2023-07-05

## 2023-07-05 RX ORDER — SODIUM CHLORIDE 9 MG/ML
INJECTION, SOLUTION INTRAVENOUS CONTINUOUS
Status: CANCELLED | OUTPATIENT
Start: 2023-07-05

## 2023-07-05 NOTE — PROGRESS NOTES
Rosenda Singletary, APRN CNP  You 2 hours ago (10:56 AM)     LV  Yes dual chamber [employer] device with ILR explant thanks!

## 2023-07-13 ENCOUNTER — ANCILLARY PROCEDURE (OUTPATIENT)
Dept: CARDIOLOGY | Facility: CLINIC | Age: 78
End: 2023-07-13
Attending: INTERNAL MEDICINE
Payer: MEDICARE

## 2023-07-13 DIAGNOSIS — Z95.818 IMPLANTABLE LOOP RECORDER PRESENT: ICD-10-CM

## 2023-07-13 PROCEDURE — G2066 INTER DEVC REMOTE 30D: HCPCS

## 2023-07-13 PROCEDURE — 93297 REM INTERROG DEV EVAL ICPMS: CPT | Performed by: INTERNAL MEDICINE

## 2023-07-18 LAB
MDC_IDC_EPISODE_DTM: NORMAL
MDC_IDC_EPISODE_DURATION: 120 S
MDC_IDC_EPISODE_DURATION: 14 S
MDC_IDC_EPISODE_ID: NORMAL
MDC_IDC_EPISODE_TYPE: NORMAL
MDC_IDC_MSMT_BATTERY_DTM: NORMAL
MDC_IDC_MSMT_BATTERY_STATUS: NORMAL
MDC_IDC_PG_IMPLANT_DTM: NORMAL
MDC_IDC_PG_MFG: NORMAL
MDC_IDC_PG_MODEL: NORMAL
MDC_IDC_PG_SERIAL: NORMAL
MDC_IDC_PG_TYPE: NORMAL
MDC_IDC_SESS_CLINIC_NAME: NORMAL
MDC_IDC_SESS_DTM: NORMAL
MDC_IDC_SESS_TYPE: NORMAL
MDC_IDC_STAT_AT_BURDEN_PERCENT: 1 %
MDC_IDC_STAT_AT_DTM_END: NORMAL
MDC_IDC_STAT_AT_DTM_START: NORMAL

## 2023-07-21 ENCOUNTER — OFFICE VISIT (OUTPATIENT)
Dept: OPHTHALMOLOGY | Facility: CLINIC | Age: 78
End: 2023-07-21
Payer: MEDICARE

## 2023-07-21 DIAGNOSIS — H43.812 POSTERIOR VITREOUS DETACHMENT OF LEFT EYE: ICD-10-CM

## 2023-07-21 DIAGNOSIS — Z01.00 EXAMINATION OF EYES AND VISION: Primary | ICD-10-CM

## 2023-07-21 DIAGNOSIS — H25.813 COMBINED FORMS OF AGE-RELATED CATARACT OF BOTH EYES: ICD-10-CM

## 2023-07-21 DIAGNOSIS — H52.4 HYPEROPIA OF BOTH EYES WITH REGULAR ASTIGMATISM AND PRESBYOPIA: ICD-10-CM

## 2023-07-21 DIAGNOSIS — H52.03 HYPEROPIA OF BOTH EYES WITH REGULAR ASTIGMATISM AND PRESBYOPIA: ICD-10-CM

## 2023-07-21 DIAGNOSIS — H52.223 HYPEROPIA OF BOTH EYES WITH REGULAR ASTIGMATISM AND PRESBYOPIA: ICD-10-CM

## 2023-07-21 PROCEDURE — 92014 COMPRE OPH EXAM EST PT 1/>: CPT | Performed by: STUDENT IN AN ORGANIZED HEALTH CARE EDUCATION/TRAINING PROGRAM

## 2023-07-21 PROCEDURE — 92015 DETERMINE REFRACTIVE STATE: CPT | Mod: GY | Performed by: STUDENT IN AN ORGANIZED HEALTH CARE EDUCATION/TRAINING PROGRAM

## 2023-07-21 ASSESSMENT — CONF VISUAL FIELD
OD_SUPERIOR_TEMPORAL_RESTRICTION: 0
OS_INFERIOR_TEMPORAL_RESTRICTION: 0
OD_SUPERIOR_NASAL_RESTRICTION: 0
OD_INFERIOR_NASAL_RESTRICTION: 0
OS_INFERIOR_NASAL_RESTRICTION: 0
OS_NORMAL: 1
OS_SUPERIOR_TEMPORAL_RESTRICTION: 0
OD_INFERIOR_TEMPORAL_RESTRICTION: 0
OS_SUPERIOR_NASAL_RESTRICTION: 0
OD_NORMAL: 1

## 2023-07-21 ASSESSMENT — REFRACTION_MANIFEST
OS_CYLINDER: +0.50
OD_ADD: +2.00
OS_ADD: +2.00
OD_SPHERE: +0.75
OD_AXIS: 004
OD_CYLINDER: +0.75
OS_AXIS: 166
OS_SPHERE: +0.50

## 2023-07-21 ASSESSMENT — REFRACTION_WEARINGRX
OD_AXIS: 180
OD_SPHERE: +1.25
OS_SPHERE: +1.00
OS_AXIS: 005
OS_ADD: +1.25
OD_ADD: +1.25
SPECS_TYPE: PAL
OS_CYLINDER: +0.50
OD_CYLINDER: +0.75

## 2023-07-21 ASSESSMENT — VISUAL ACUITY
OS_CC+: -1
OS_CC: 20/25
OD_CC: J2-
OD_PH_CC+: -1
OD_CC: 20/40
OD_CC+: -1
OS_CC: J1-
CORRECTION_TYPE: GLASSES
METHOD: SNELLEN - LINEAR
OD_PH_CC: 20/30

## 2023-07-21 ASSESSMENT — TONOMETRY
OS_IOP_MMHG: 17
OD_IOP_MMHG: 17
IOP_METHOD: APPLANATION

## 2023-07-21 ASSESSMENT — EXTERNAL EXAM - RIGHT EYE: OD_EXAM: NORMAL

## 2023-07-21 ASSESSMENT — EXTERNAL EXAM - LEFT EYE: OS_EXAM: NORMAL

## 2023-07-21 ASSESSMENT — CUP TO DISC RATIO
OS_RATIO: 0.2
OD_RATIO: 0.2

## 2023-07-21 NOTE — LETTER
7/21/2023         RE: Aurelio Hassan  [address]  [place] [address]        Dear Colleague,    Thank you for referring your patient, Aurelio Hassan, to the [facility]. Please see a copy of my visit note below.     Current Eye Medications:  None.       Subjective:  Comprehensive Eye Exam.  Vision appears to be about the same in each eye, with correction.       Objective:  See Ophthalmology Exam.       Assessment:  Aurelio Hassan is a 77 year old male who presents with:   Encounter Diagnoses   Name Primary?     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        Sincerely,        Ernesto Xiong MD

## 2023-07-21 NOTE — PROGRESS NOTES
Current Eye Medications:  None.       Subjective:  Comprehensive Eye Exam.  Vision appears to be about the same in each eye, with correction.       Objective:  See Ophthalmology Exam.       Assessment:  Aurelio Hassan is a 77 year old male who presents with:   Encounter Diagnoses   Name Primary?    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

## 2023-08-18 ENCOUNTER — PATIENT OUTREACH (OUTPATIENT)
Dept: CARDIOLOGY | Facility: CLINIC | Age: 78
End: 2023-08-18
Payer: MEDICARE

## 2023-08-18 NOTE — PROGRESS NOTES
Called and spoke to patient to review pre-procedure instructions for upcoming ILR Explant, PPM implant procedure on 8/21/23. Patient received letter and does not have any questions at this time.

## 2023-08-21 ENCOUNTER — APPOINTMENT (OUTPATIENT)
Dept: GENERAL RADIOLOGY | Facility: CLINIC | Age: 78
End: 2023-08-21
Attending: NURSE PRACTITIONER
Payer: MEDICARE

## 2023-08-21 ENCOUNTER — HOSPITAL ENCOUNTER (OUTPATIENT)
Facility: CLINIC | Age: 78
Discharge: HOME | End: 2023-08-21
Attending: INTERNAL MEDICINE | Admitting: INTERNAL MEDICINE
Payer: MEDICARE

## 2023-08-21 ENCOUNTER — APPOINTMENT (OUTPATIENT)
Dept: MEDSURG UNIT | Facility: CLINIC | Age: 78
End: 2023-08-21
Attending: INTERNAL MEDICINE
Payer: MEDICARE

## 2023-08-21 ENCOUNTER — APPOINTMENT (OUTPATIENT)
Dept: LAB | Facility: CLINIC | Age: 78
End: 2023-08-21
Attending: INTERNAL MEDICINE
Payer: MEDICARE

## 2023-08-21 VITALS
WEIGHT: 164.9 LBS | DIASTOLIC BLOOD PRESSURE: 84 MMHG | TEMPERATURE: 97.5 F | RESPIRATION RATE: 16 BRPM | OXYGEN SATURATION: 98 % | HEART RATE: 75 BPM | SYSTOLIC BLOOD PRESSURE: 111 MMHG | BODY MASS INDEX: 24.26 KG/M2

## 2023-08-21 DIAGNOSIS — Z95.0 S/P PLACEMENT OF CARDIAC PACEMAKER: Primary | ICD-10-CM

## 2023-08-21 DIAGNOSIS — I49.5 SICK SINUS SYNDROME (H): ICD-10-CM

## 2023-08-21 LAB
ANION GAP SERPL CALCULATED.3IONS-SCNC: 10 MMOL/L (ref 7–15)
BUN SERPL-MCNC: 17.5 MG/DL (ref 8–23)
CALCIUM SERPL-MCNC: 9.2 MG/DL (ref 8.8–10.2)
CHLORIDE SERPL-SCNC: 103 MMOL/L (ref 98–107)
CREAT SERPL-MCNC: 1.05 MG/DL (ref 0.67–1.17)
DEPRECATED HCO3 PLAS-SCNC: 24 MMOL/L (ref 22–29)
ERYTHROCYTE [DISTWIDTH] IN BLOOD BY AUTOMATED COUNT: 13.2 % (ref 10–15)
GFR SERPL CREATININE-BSD FRML MDRD: 73 ML/MIN/1.73M2
GLUCOSE SERPL-MCNC: 99 MG/DL (ref 70–99)
HCT VFR BLD AUTO: 50.1 % (ref 40–53)
HGB BLD-MCNC: 16.4 G/DL (ref 13.3–17.7)
MCH RBC QN AUTO: 32.9 PG (ref 26.5–33)
MCHC RBC AUTO-ENTMCNC: 32.7 G/DL (ref 31.5–36.5)
MCV RBC AUTO: 100 FL (ref 78–100)
PLATELET # BLD AUTO: 198 10E3/UL (ref 150–450)
POTASSIUM SERPL-SCNC: 5 MMOL/L (ref 3.4–5.3)
RBC # BLD AUTO: 4.99 10E6/UL (ref 4.4–5.9)
SODIUM SERPL-SCNC: 137 MMOL/L (ref 136–145)
WBC # BLD AUTO: 6.5 10E3/UL (ref 4–11)

## 2023-08-21 PROCEDURE — 250N000011 HC RX IP 250 OP 636: Mod: JZ | Performed by: INTERNAL MEDICINE

## 2023-08-21 PROCEDURE — 99152 MOD SED SAME PHYS/QHP 5/>YRS: CPT | Mod: GC | Performed by: INTERNAL MEDICINE

## 2023-08-21 PROCEDURE — 71045 X-RAY EXAM CHEST 1 VIEW: CPT | Mod: 26 | Performed by: STUDENT IN AN ORGANIZED HEALTH CARE EDUCATION/TRAINING PROGRAM

## 2023-08-21 PROCEDURE — C1894 INTRO/SHEATH, NON-LASER: HCPCS | Performed by: INTERNAL MEDICINE

## 2023-08-21 PROCEDURE — 250N000009 HC RX 250: Performed by: INTERNAL MEDICINE

## 2023-08-21 PROCEDURE — 33208 INSRT HEART PM ATRIAL & VENT: CPT | Mod: KX | Performed by: INTERNAL MEDICINE

## 2023-08-21 PROCEDURE — 33286 RMVL SUBQ CAR RHYTHM MNTR: CPT | Performed by: INTERNAL MEDICINE

## 2023-08-21 PROCEDURE — 99152 MOD SED SAME PHYS/QHP 5/>YRS: CPT | Performed by: INTERNAL MEDICINE

## 2023-08-21 PROCEDURE — 36415 COLL VENOUS BLD VENIPUNCTURE: CPT | Performed by: INTERNAL MEDICINE

## 2023-08-21 PROCEDURE — 999N000142 HC STATISTIC PROCEDURE PREP ONLY

## 2023-08-21 PROCEDURE — 258N000003 HC RX IP 258 OP 636: Performed by: INTERNAL MEDICINE

## 2023-08-21 PROCEDURE — 272N000001 HC OR GENERAL SUPPLY STERILE: Performed by: INTERNAL MEDICINE

## 2023-08-21 PROCEDURE — 93005 ELECTROCARDIOGRAM TRACING: CPT

## 2023-08-21 PROCEDURE — 85027 COMPLETE CBC AUTOMATED: CPT | Performed by: INTERNAL MEDICINE

## 2023-08-21 PROCEDURE — C1898 LEAD, PMKR, OTHER THAN TRANS: HCPCS | Performed by: INTERNAL MEDICINE

## 2023-08-21 PROCEDURE — 999N000134 HC STATISTIC PP CARE STAGE 3

## 2023-08-21 PROCEDURE — 99153 MOD SED SAME PHYS/QHP EA: CPT | Performed by: INTERNAL MEDICINE

## 2023-08-21 PROCEDURE — 33286 RMVL SUBQ CAR RHYTHM MNTR: CPT | Mod: GC | Performed by: INTERNAL MEDICINE

## 2023-08-21 PROCEDURE — 80048 BASIC METABOLIC PNL TOTAL CA: CPT | Performed by: INTERNAL MEDICINE

## 2023-08-21 PROCEDURE — 999N000054 HC STATISTIC EKG NON-CHARGEABLE

## 2023-08-21 PROCEDURE — C1785 PMKR, DUAL, RATE-RESP: HCPCS | Performed by: INTERNAL MEDICINE

## 2023-08-21 PROCEDURE — 999N000065 XR CHEST PORT 1 VIEW

## 2023-08-21 PROCEDURE — 93010 ELECTROCARDIOGRAM REPORT: CPT | Performed by: INTERNAL MEDICINE

## 2023-08-21 DEVICE — PCMKR CARD ACCOLADE MRI EL DR: Type: IMPLANTABLE DEVICE | Status: FUNCTIONAL

## 2023-08-21 DEVICE — LEAD INGEVITY+ AF IS1 7841 52CM: Type: IMPLANTABLE DEVICE | Status: FUNCTIONAL

## 2023-08-21 DEVICE — LEAD INGEVITY+ AF IS1 7840 4CM: Type: IMPLANTABLE DEVICE | Status: FUNCTIONAL

## 2023-08-21 RX ORDER — DIPHENHYDRAMINE HYDROCHLORIDE 50 MG/ML
INJECTION INTRAMUSCULAR; INTRAVENOUS
Status: DISCONTINUED | OUTPATIENT
Start: 2023-08-21 | End: 2023-08-21 | Stop reason: HOSPADM

## 2023-08-21 RX ORDER — NALOXONE HYDROCHLORIDE 0.4 MG/ML
0.2 INJECTION, SOLUTION INTRAMUSCULAR; INTRAVENOUS; SUBCUTANEOUS
Status: DISCONTINUED | OUTPATIENT
Start: 2023-08-21 | End: 2023-08-21 | Stop reason: HOSPADM

## 2023-08-21 RX ORDER — IOPAMIDOL 755 MG/ML
INJECTION, SOLUTION INTRAVASCULAR
Status: DISCONTINUED | OUTPATIENT
Start: 2023-08-21 | End: 2023-08-21 | Stop reason: HOSPADM

## 2023-08-21 RX ORDER — LIDOCAINE HYDROCHLORIDE 20 MG/ML
INJECTION, SOLUTION INFILTRATION; PERINEURAL
Status: DISCONTINUED | OUTPATIENT
Start: 2023-08-21 | End: 2023-08-21 | Stop reason: HOSPADM

## 2023-08-21 RX ORDER — ACETAMINOPHEN AND CODEINE PHOSPHATE 300; 30 MG/1; MG/1
1 TABLET ORAL EVERY 4 HOURS PRN
Qty: 15 TABLET | Refills: 0 | Status: SHIPPED | OUTPATIENT
Start: 2023-08-21 | End: 2024-09-17

## 2023-08-21 RX ORDER — CEFAZOLIN SODIUM 2 G/100ML
2 INJECTION, SOLUTION INTRAVENOUS
Status: COMPLETED | OUTPATIENT
Start: 2023-08-21 | End: 2023-08-21

## 2023-08-21 RX ORDER — ATORVASTATIN CALCIUM 80 MG/1
80 TABLET, FILM COATED ORAL DAILY
Status: DISCONTINUED | OUTPATIENT
Start: 2023-08-21 | End: 2023-08-21 | Stop reason: HOSPADM

## 2023-08-21 RX ORDER — NALOXONE HYDROCHLORIDE 0.4 MG/ML
0.4 INJECTION, SOLUTION INTRAMUSCULAR; INTRAVENOUS; SUBCUTANEOUS
Status: DISCONTINUED | OUTPATIENT
Start: 2023-08-21 | End: 2023-08-21 | Stop reason: HOSPADM

## 2023-08-21 RX ORDER — SODIUM CHLORIDE 9 MG/ML
INJECTION, SOLUTION INTRAVENOUS CONTINUOUS
Status: DISCONTINUED | OUTPATIENT
Start: 2023-08-21 | End: 2023-08-21 | Stop reason: HOSPADM

## 2023-08-21 RX ORDER — CEPHALEXIN 500 MG/1
500 TABLET ORAL 3 TIMES DAILY
Qty: 15 TABLET | Refills: 0 | Status: SHIPPED | OUTPATIENT
Start: 2023-08-21 | End: 2023-08-26

## 2023-08-21 RX ORDER — LIDOCAINE 40 MG/G
CREAM TOPICAL
Status: DISCONTINUED | OUTPATIENT
Start: 2023-08-21 | End: 2023-08-21 | Stop reason: HOSPADM

## 2023-08-21 RX ORDER — FENTANYL CITRATE 50 UG/ML
INJECTION, SOLUTION INTRAMUSCULAR; INTRAVENOUS
Status: DISCONTINUED | OUTPATIENT
Start: 2023-08-21 | End: 2023-08-21 | Stop reason: HOSPADM

## 2023-08-21 RX ORDER — OXYCODONE AND ACETAMINOPHEN 5; 325 MG/1; MG/1
1 TABLET ORAL EVERY 4 HOURS PRN
Status: DISCONTINUED | OUTPATIENT
Start: 2023-08-21 | End: 2023-08-21 | Stop reason: HOSPADM

## 2023-08-21 RX ADMIN — SODIUM CHLORIDE: 9 INJECTION, SOLUTION INTRAVENOUS at 13:50

## 2023-08-21 RX ADMIN — CEFAZOLIN SODIUM 2 G: 10 INJECTION, POWDER, FOR SOLUTION INTRAVENOUS at 14:58

## 2023-08-21 ASSESSMENT — ACTIVITIES OF DAILY LIVING (ADL)
ADLS_ACUITY_SCORE: 35

## 2023-08-21 NOTE — PROGRESS NOTES
CXR done and read by LUKAS Barragan.  No acute concerns (pneumo or lead dislodgement).  EKG showing A pacing.

## 2023-08-21 NOTE — PROGRESS NOTES
Arrived to [facility] for PPM implant procedure & Loop explant procedures in EP lab. AFVSS. Denies pain. ECG done. Labs resulted. Contrast reviewed. Consent being done. IV abx ready at bedside. Pt's wife, Judi, at bedside; to drive pt home post procedure #[phone]. Pt stable.    Speaking Coherently

## 2023-08-21 NOTE — DISCHARGE INSTRUCTIONS
Home Care after a Pacemaker Implant    Wound care:  Keep your incision (surgery wound) dry for 3 days.  After 3 days, you may remove the outer bandage.  Keep the strips of tape on.  They will be removed at your clinic visit.  Check for signs of infection each day.  These include increased redness, swelling, drainage, bleeding or a fever over 101 F (38.3 C).  Call us immediately if you see any of these signs.  If there are no signs of infection, you may shower in 3 days.  Do not submerge the incision (in a bath tub, hot tub, or swimming pool) until fully healed.    Pain:   You may have mild to moderate pain for 3 to 5 days.  Take acetaminophen (Tylenol) or ibuprofen (Advil) for the pain.  Call us if the pain is severe or lasts more than 5 days.    Activity:  After 24 hours, slowly return to your normal activities.   Healing will take 4 to 6 weeks.  No driving for 3 days  Avoid climbing a ladder alone.  It is best to stay within 4 feet of the ground.  Avoid anything that may cause rough contact or a hard hit to your chest.  This includes football, hockey, and other contact sports.  For at least 4 weeks:  Do not raise your affected arm above your shoulder.  Do not use your affected arm to push, pull, or lift anything over 10 pounds.  Avoid repetitive upper body activities for 6 weeks (ie: golf, swimming, and weight lifting)    Follow Up Visits:  Return to the clinic in 7 to 10 days to have your device and wound checked.      Telling others about your device:  Before you have any medical tests or treatments, tell the doctors, dentists, and other care providers about your device.  There are a few tests and treatments that may interfere with your device.  (These include MRI, radiation therapy, electrocautery, and others.)  Your care team may need to take special steps to keep you safe.  Before you leave the hospital, you will receive a temporary ID card.  A permanent card will be mailed to you about 6 to 8 weeks later.   Always carry the ID card with you.  It has important details about your device.  You should also get a MedicAlert ID.  Please ask us for a MedicAlert brochure, or go to [email or web address].    Safety near electrical equipment:  All of these are safe to use when in good repair:  Microwaves  Radios  Cordless phone  Remote controls  Small electrical tools  Cell phones: Keep cell phones at least 6 inches from your device.  Do not carry the phone in a pocket near your device.  Security murillo: It is okay to walk through security murillo at the airports and department stores.  Tell airport security that you have a pacemaker.  They should keep the screening wand at least 6 inches from your device.  Full-body scanners are safe.  Avoid the following:   MRI tests in the hospital unless you have a MRI safe pacemaker.          Arc welding, chain saws and high-powered industrial or commercial tools.   Power lines, power plants and large power generators.   Electric body fat scales.   Magnetic mattress pads or pillow.    Questions?  Please call [facility].   Device Nurse:          Business Hours:  [phone]                       After Hours:  [phone]   Choose option 4, then ask for the                                                  on-call device nurse at job code [id number].    Your next device clinic appointment is scheduled on:    8/30/23 at 1 PM            [facility]  [facility] [address]  [address] [place] [address]  [id number]

## 2023-08-21 NOTE — PROGRESS NOTES
D/I/A:  Patient is tolerating liquids and foods, ambulating, urinating, puncture sites are stable (no bleeding and no hematoma) and patient has a  - wife Judi.  A+O x4 and making needs known.  PPM and old loop recorder incisions stable; no bleeding or hematoma; CMS intact.  VSSA.  AV paced/SR on monitor.  IV access removed.  Education completed and outlined in AVS or handout: medications reviewed with patient.  Prescription meds picked up from Discharge Pharmacy and given to patient.  Questions answered prior to discharge.  Sling placed on LUE to help with extremity restrictions.  Belongings returned to patient at discharge.    P: Discharged to self care.  Patient to follow up with appts as per discharge instruction.

## 2023-08-21 NOTE — H&P
Electrophysiology Pre-Procedure History and Physical    Aurelio Hassan MRN# [id number]   Age: 77 year old YOB: 1945      Date of Procedure: 8/21/2023 [facility]      Date of Exam 8/21/2023 Facility (Same day)       HPI:  Mr. Hassan is a 77 year old male who has a medical history significant for CAD s/p PCI to LM, ILR implanted in 2020 for bradycardia after having 2 episodes of second degree AVB, symptoms not timed, otherwise no arrhythmias. He had an ILR implanted for close monitoring. He has had 45 pauses >3 seconds, 23 from January to May 2023 and 4 May into June 2023 noted on ILR. Last echo from OSH in 2/2023 reported normal LVEF, trace MR, and trace TR. After discussion of benefits and risks of implanting a pacemaker, including the need for higher risk of needing PPM in future and the need for BB for CAD, the patient decided to proceed with PPM implant today and ILR removal.          Active problem list:     Patient Active Problem List    Diagnosis Date Noted    Hyperlipidemia LDL goal <70 09/08/2021     Priority: Medium    Coronary artery disease involving native coronary artery of native heart without angina pectoris 09/08/2021     Priority: Medium    History of skin cancer 09/04/2020     Priority: Medium    Sinus bradycardia 09/04/2020     Priority: Medium    History of hip replacement, total, right 08/17/2020     Priority: Medium     2017      Nuclear sclerosis of both eyes 08/22/2016     Priority: Medium    Posterior vitreous detachment of right eye 08/22/2016     Priority: Medium    Pterygium of left eye 08/22/2016     Priority: Medium            Medications (include herbals and vitamins):      Current Facility-Administered Medications   Medication    ceFAZolin (ANCEF) 2 g in 100 mL D5W intermittent infusion    lidocaine (LMX4) cream    lidocaine 1 % 0.1-1 mL    sodium chloride (PF) 0.9% PF flush 3 mL    sodium chloride (PF) 0.9% PF flush  3 mL    sodium chloride (PF) 0.9% PF flush 3 mL    sodium chloride 0.9% infusion           Medication List      There are no discharge medications for this visit.              Allergies:    No Known Allergies          Social History:     Social History     Tobacco Use    Smoking status: Former     Types: Cigarettes     Quit date: 1970     Years since quittin.6    Smokeless tobacco: Never   Substance Use Topics    Alcohol use: Yes     Comment: 2 drinks daily             Physical Exam:   All vitals have been reviewed  Patient Vitals for the past 8 hrs:   BP Temp Temp src Pulse Resp SpO2 Weight   23 1310 135/81 98.1  F (36.7  C) Oral (!) 43 19 94 % 74.8 kg (164 lb 14.5 oz)     No intake/output data recorded.  Airway assessment:   Patient is able to open mouth wide  Patient is able to stick out tongue}      ENT:   Normocephalic, without obvious abnormality, atraumatic, sinuses nontender on palpation, external ears without lesions, oral pharynx with moist mucous membranes, tonsils without erythema or exudates, gums normal and good dentition.     Neck:   Supple, symmetrical, trachea midline, no adenopathy, thyroid symmetric, not enlarged and no tenderness, skin normal     Lungs:   No increased work of breathing, good air exchange, clear to auscultation bilaterally, no crackles or wheezing     Cardiovascular:   Normal apical impulse, regular rate and rhythm, normal S1 and S2, no S3 or S4, and no murmur noted             Lab / Radiology Results:     Lab Results   Component Value Date    WBC 6.5 2023    WBC 6.7 2020    RBC 4.99 2023    RBC 4.68 2020    HGB 16.4 2023    HGB 15.6 2020    HCT 50.1 2023    HCT 46.8 2020     2023     2020    RDW 13.2 2023    RDW 13.3 2020     2023     2020      Lab Results   Component Value Date    WBC 6.5 2023    WBC 6.7 2020     Lab Results   Component Value  Date     08/21/2023     08/18/2020     Lab Results   Component Value Date    HGB 16.4 08/21/2023    HGB 15.6 08/18/2020    HCT 50.1 08/21/2023    HCT 46.8 08/18/2020     Lab Results   Component Value Date     08/21/2023     08/18/2020    CO2 24 08/21/2023    CO2 28 08/30/2022    CO2 26 08/18/2020    BUN 17.5 08/21/2023    BUN 16 08/30/2022    BUN 15 08/18/2020     Lab Results   Component Value Date     08/21/2023     08/18/2020    CO2 24 08/21/2023    CO2 28 08/30/2022    CO2 26 08/18/2020    BUN 17.5 08/21/2023    BUN 16 08/30/2022    BUN 15 08/18/2020     No components found for: AP, ALB, PROT, SGOT, SGPT, TBIL, DBILCALC  No components found for: PTINR  No components found for: APTT[APTT}  No components found for: UCOLOR, UCLARITY, USPGRAV, UPH, UPROTEIN, UGLUCOSE, UKETONE, UBILI, UBLOOD, UNITRITE, UUROBIL, ULEUKEST, USQEPI, URENEPI, UWBC, URBC, UBACTERIA, UHYALINE  No results found for: TSH  No components found for: UPT          Plan:   Patient's active problems diagnostically and therapeutically optimized for the planned procedure. Patient here for PPM implant and ILR removal. Procedure explained in detail to patient including indications, risks, and benefits. Patient states understanding and wishes to procced.     Aidan John  Electrophysiology Service  Pager: [phone]

## 2023-08-21 NOTE — PROGRESS NOTES
D/I/A: Pt roomed on 3C in [address].  Arrived via litter and accompanied by cath lab RN on monitor.  VSSA.  Rhythm upon arrival A and V paced on monitor.  Denies pain or sob.  Reviewed activity restrictions and when to notify RN, ie-changes to breathing or increased chest pressure or chest pain.  CCL access:  L chest PPM and old loop recorder incisions stable, no bleeding or hematoma.  Dressing CDI.   P: Continue to monitor status.  Discharge to home once meeting criteria.

## 2023-08-21 NOTE — Clinical Note
The left subclavian vein was selected. Access was obtained under with Fluoroscopic guidance using a micropuncture 21 gauge needle with direct visualization of needle entry.

## 2023-08-22 LAB
ATRIAL RATE - MUSE: 42 BPM
ATRIAL RATE - MUSE: 50 BPM
DIASTOLIC BLOOD PRESSURE - MUSE: NORMAL MMHG
DIASTOLIC BLOOD PRESSURE - MUSE: NORMAL MMHG
INTERPRETATION ECG - MUSE: NORMAL
INTERPRETATION ECG - MUSE: NORMAL
P AXIS - MUSE: -24 DEGREES
P AXIS - MUSE: 8 DEGREES
PR INTERVAL - MUSE: 294 MS
PR INTERVAL - MUSE: 388 MS
QRS DURATION - MUSE: 90 MS
QRS DURATION - MUSE: 96 MS
QT - MUSE: 466 MS
QT - MUSE: 476 MS
QTC - MUSE: 397 MS
QTC - MUSE: 424 MS
R AXIS - MUSE: 17 DEGREES
R AXIS - MUSE: 19 DEGREES
SYSTOLIC BLOOD PRESSURE - MUSE: NORMAL MMHG
SYSTOLIC BLOOD PRESSURE - MUSE: NORMAL MMHG
T AXIS - MUSE: 27 DEGREES
T AXIS - MUSE: 40 DEGREES
VENTRICULAR RATE- MUSE: 42 BPM
VENTRICULAR RATE- MUSE: 50 BPM

## 2023-08-30 ENCOUNTER — ANCILLARY PROCEDURE (OUTPATIENT)
Dept: CARDIOLOGY | Facility: CLINIC | Age: 78
End: 2023-08-30
Attending: INTERNAL MEDICINE
Payer: MEDICARE

## 2023-08-30 PROCEDURE — 93280 PM DEVICE PROGR EVAL DUAL: CPT | Performed by: INTERNAL MEDICINE

## 2023-09-10 ASSESSMENT — ENCOUNTER SYMPTOMS
CHILLS: 0
DYSURIA: 0
HEADACHES: 0
HEARTBURN: 0
NERVOUS/ANXIOUS: 0
MYALGIAS: 0
FEVER: 0
FREQUENCY: 0
HEMATURIA: 0
JOINT SWELLING: 0
SORE THROAT: 0
ABDOMINAL PAIN: 0
HEMATOCHEZIA: 0
PARESTHESIAS: 0
SHORTNESS OF BREATH: 0
ARTHRALGIAS: 0
PALPITATIONS: 0
DIARRHEA: 0
WEAKNESS: 0
COUGH: 0
NAUSEA: 0
CONSTIPATION: 0
EYE PAIN: 0
DIZZINESS: 0

## 2023-09-10 ASSESSMENT — ACTIVITIES OF DAILY LIVING (ADL): CURRENT_FUNCTION: NO ASSISTANCE NEEDED

## 2023-09-13 ENCOUNTER — LAB (OUTPATIENT)
Dept: LAB | Facility: CLINIC | Age: 78
End: 2023-09-13
Payer: MEDICARE

## 2023-09-13 DIAGNOSIS — E78.5 HYPERLIPIDEMIA LDL GOAL <70: ICD-10-CM

## 2023-09-13 DIAGNOSIS — I25.10 CORONARY ARTERY DISEASE INVOLVING NATIVE CORONARY ARTERY OF NATIVE HEART WITHOUT ANGINA PECTORIS: ICD-10-CM

## 2023-09-13 LAB
ALT SERPL W P-5'-P-CCNC: 37 U/L (ref 0–70)
ANION GAP SERPL CALCULATED.3IONS-SCNC: 9 MMOL/L (ref 7–15)
BUN SERPL-MCNC: 17.4 MG/DL (ref 8–23)
CALCIUM SERPL-MCNC: 9.2 MG/DL (ref 8.8–10.2)
CHLORIDE SERPL-SCNC: 102 MMOL/L (ref 98–107)
CHOLEST SERPL-MCNC: 142 MG/DL
CREAT SERPL-MCNC: 1.05 MG/DL (ref 0.67–1.17)
DEPRECATED HCO3 PLAS-SCNC: 25 MMOL/L (ref 22–29)
EGFRCR SERPLBLD CKD-EPI 2021: 73 ML/MIN/1.73M2
ERYTHROCYTE [DISTWIDTH] IN BLOOD BY AUTOMATED COUNT: 12.9 % (ref 10–15)
GLUCOSE SERPL-MCNC: 92 MG/DL (ref 70–99)
HCT VFR BLD AUTO: 48.6 % (ref 40–53)
HDLC SERPL-MCNC: 67 MG/DL
HGB BLD-MCNC: 16.2 G/DL (ref 13.3–17.7)
LDLC SERPL CALC-MCNC: 61 MG/DL
MCH RBC QN AUTO: 33.1 PG (ref 26.5–33)
MCHC RBC AUTO-ENTMCNC: 33.3 G/DL (ref 31.5–36.5)
MCV RBC AUTO: 99 FL (ref 78–100)
NONHDLC SERPL-MCNC: 75 MG/DL
PLATELET # BLD AUTO: 179 10E3/UL (ref 150–450)
POTASSIUM SERPL-SCNC: 4.2 MMOL/L (ref 3.4–5.3)
RBC # BLD AUTO: 4.9 10E6/UL (ref 4.4–5.9)
SODIUM SERPL-SCNC: 136 MMOL/L (ref 136–145)
TRIGL SERPL-MCNC: 70 MG/DL
WBC # BLD AUTO: 6.7 10E3/UL (ref 4–11)

## 2023-09-13 PROCEDURE — 80048 BASIC METABOLIC PNL TOTAL CA: CPT

## 2023-09-13 PROCEDURE — 84460 ALANINE AMINO (ALT) (SGPT): CPT

## 2023-09-13 PROCEDURE — 85027 COMPLETE CBC AUTOMATED: CPT

## 2023-09-13 PROCEDURE — 80061 LIPID PANEL: CPT

## 2023-09-13 PROCEDURE — 36415 COLL VENOUS BLD VENIPUNCTURE: CPT

## 2023-09-15 ENCOUNTER — OFFICE VISIT (OUTPATIENT)
Dept: FAMILY MEDICINE | Facility: CLINIC | Age: 78
End: 2023-09-15
Payer: MEDICARE

## 2023-09-15 VITALS
HEART RATE: 50 BPM | WEIGHT: 169.4 LBS | SYSTOLIC BLOOD PRESSURE: 118 MMHG | OXYGEN SATURATION: 99 % | BODY MASS INDEX: 25.09 KG/M2 | TEMPERATURE: 97.4 F | HEIGHT: 69 IN | DIASTOLIC BLOOD PRESSURE: 77 MMHG

## 2023-09-15 DIAGNOSIS — R00.1 SYMPTOMATIC BRADYCARDIA: ICD-10-CM

## 2023-09-15 DIAGNOSIS — Z00.00 ENCOUNTER FOR MEDICARE ANNUAL WELLNESS EXAM: Primary | ICD-10-CM

## 2023-09-15 DIAGNOSIS — I44.1 SECOND DEGREE AV BLOCK: ICD-10-CM

## 2023-09-15 DIAGNOSIS — I25.10 CORONARY ARTERY DISEASE INVOLVING NATIVE CORONARY ARTERY OF NATIVE HEART WITHOUT ANGINA PECTORIS: ICD-10-CM

## 2023-09-15 DIAGNOSIS — E78.5 HYPERLIPIDEMIA LDL GOAL <70: ICD-10-CM

## 2023-09-15 DIAGNOSIS — Z95.0 S/P PLACEMENT OF CARDIAC PACEMAKER: ICD-10-CM

## 2023-09-15 PROCEDURE — G0439 PPPS, SUBSEQ VISIT: HCPCS | Performed by: FAMILY MEDICINE

## 2023-09-15 ASSESSMENT — ENCOUNTER SYMPTOMS
FREQUENCY: 0
EYE PAIN: 0
SORE THROAT: 0
HEARTBURN: 0
HEMATURIA: 0
PALPITATIONS: 0
WEAKNESS: 0
CONSTIPATION: 0
JOINT SWELLING: 0
CHILLS: 0
NAUSEA: 0
DIZZINESS: 0
HEMATOCHEZIA: 0
NERVOUS/ANXIOUS: 0
DIARRHEA: 0
SHORTNESS OF BREATH: 0
DYSURIA: 0
MYALGIAS: 0
PARESTHESIAS: 0
ABDOMINAL PAIN: 0
ARTHRALGIAS: 0
COUGH: 0
HEADACHES: 0
FEVER: 0

## 2023-09-15 ASSESSMENT — ACTIVITIES OF DAILY LIVING (ADL): CURRENT_FUNCTION: NO ASSISTANCE NEEDED

## 2023-09-15 NOTE — PROGRESS NOTES
The patient was counseled and encouraged to consider modifying their diet and eating habits. He was provided with information on recommended healthy diet options.  The patient was provided with written information regarding signs of hearing loss.

## 2023-09-15 NOTE — PATIENT INSTRUCTIONS
Patient Education   Personalized Prevention Plan  You are due for the preventive services outlined below.  Your care team is available to assist you in scheduling these services.  If you have already completed any of these items, please share that information with your care team to update in your medical record.  Health Maintenance Due   Topic Date Due     Zoster (Shingles) Vaccine (2 of 2) 04/14/2020     COVID-19 Vaccine (3 - Moderna series) 04/01/2021     Flu Vaccine (1) 09/01/2023     ANNUAL REVIEW OF HM ORDERS  09/12/2023     Annual Wellness Visit  09/12/2023     Learning About Dietary Guidelines  What are the Dietary Guidelines for Americans?     Dietary Guidelines for Americans provide tips for eating well and staying healthy. This helps reduce the risk for long-term (chronic) diseases.  These guidelines recommend that you:  Eat and drink the right amount for you. The [place] government's food guide is called MyPlate. It can help you make your own well-balanced eating plan.  Try to balance your eating with your activity. This helps you stay at a healthy weight.  Drink alcohol in moderation, if at all.  Limit foods high in salt, saturated fat, trans fat, and added sugar.  These guidelines are from the U.[place] Department of Agriculture and the U.S[place] Department of Health and Human Services. They are updated every 5 years.  What is MyPlate?  MyPlate is the [place] government's food guide. It can help you make your own well-balanced eating plan. A balanced eating plan means that you eat enough, but not too much, and that your food gives you the nutrients you need to stay healthy.  MyPlate focuses on eating plenty of whole grains, fruits, and vegetables, and on limiting fat and sugar. It is available online at www.ChooseMyPlate.gov.  How can you get started?  If you're trying to eat healthier, you can slowly change your eating habits over time. You don't have to make big changes all at once. Start by adding one or two healthy  "foods to your meals each day.  Grains  Choose whole-grain breads and cereals and whole-wheat pasta and whole-grain crackers.  Vegetables  Eat a variety of vegetables every day. They have lots of nutrients and are part of a heart-healthy diet.  Fruits  Eat a variety of fruits every day. Fruits contain lots of nutrients. Choose fresh fruit instead of fruit juice.  Protein foods  Choose fish and lean poultry more often. Eat red meat and fried meats less often. Dried beans, tofu, and nuts are also good sources of protein.  Dairy  Choose low-fat or fat-free products from this food group. If you have problems digesting milk, try eating cheese or yogurt instead.  Fats and oils  Limit fats and oils if you're trying to cut calories. Choose healthy fats when you cook. These include canola oil and olive oil.  Where can you learn more?  Go to https://www.[employer].net/patiented  Enter D676 in the search box to learn more about \"Learning About Dietary Guidelines.\"  Current as of: March 1, 2023               Content Version: 13.7    3292-7577 [employer].   Care instructions adapted under license by your healthcare professional. If you have questions about a medical condition or this instruction, always ask your healthcare professional. [employer] disclaims any warranty or liability for your use of this information.      Hearing Loss: Care Instructions  Overview     Hearing loss is a sudden or slow decrease in how well you hear. It can range from slight to profound. Permanent hearing loss can occur with aging. It also can happen when you are exposed long-term to loud noise. Examples include listening to loud music, riding motorcycles, or being around other loud machines.  Hearing loss can affect your work and home life. It can make you feel lonely or depressed. You may feel that you have lost your independence. But hearing aids and other devices can help you hear better and feel connected to " others.  Follow-up care is a key part of your treatment and safety. Be sure to make and go to all appointments, and call your doctor if you are having problems. It's also a good idea to know your test results and keep a list of the medicines you take.  How can you care for yourself at home?  Avoid loud noises whenever possible. This helps keep your hearing from getting worse.  Always wear hearing protection around loud noises.  Wear a hearing aid as directed.  A professional can help you pick a hearing aid that will work best for you.  You can also get hearing aids over the counter for mild to moderate hearing loss.  Have hearing tests as your doctor suggests. They can show whether your hearing has changed. Your hearing aid may need to be adjusted.  Use other devices as needed. These may include:  Telephone amplifiers and hearing aids that can connect to a television, stereo, radio, or microphone.  Devices that use lights or vibrations. These alert you to the doorbell, a ringing telephone, or a baby monitor.  Television closed-captioning. This shows the words at the bottom of the screen. Most new TVs can do this.  TTY (text telephone). This lets you type messages back and forth on the telephone instead of talking or listening. These devices are also called TDD. When messages are typed on the keyboard, they are sent over the phone line to a receiving TTY. The message is shown on a monitor.  Use text messaging, social media, and email if it is hard for you to communicate by telephone.  Try to learn a listening technique called speechreading. It is not lipreading. You pay attention to people's gestures, expressions, posture, and tone of voice. These clues can help you understand what a person is saying. Face the person you are talking to, and have them face you. Make sure the lighting is good. You need to see the other person's face clearly.  Think about counseling if you need help to adjust to your hearing loss.  When  "should you call for help?  Watch closely for changes in your health, and be sure to contact your doctor if:    You think your hearing is getting worse.     You have new symptoms, such as dizziness or nausea.   Where can you learn more?  Go to https://www.[employer].net/patiented  Enter R798 in the search box to learn more about \"Hearing Loss: Care Instructions.\"  Current as of: March 1, 2023               Content Version: 13.7    [id number] [employer].   Care instructions adapted under license by your healthcare professional. If you have questions about a medical condition or this instruction, always ask your healthcare professional. [employer] disclaims any warranty or liability for your use of this information.         "

## 2023-09-15 NOTE — PROGRESS NOTES
"SUBJECTIVE:   Alex Hassan is a 77 year old who presents for a Preventive Visit and follow-up on some baseline health conditions.       9/15/2023    10:35 AM   Additional Questions   Roomed by An V.         9/15/2023    10:35 AM   Patient Reported Additional Medications   Patient reports taking the following new medications none       Are you in the first 12 months of your Medicare coverage?  No    Healthy Habits:     In general, how would you rate your overall health?  Excellent    Frequency of exercise:  4-5 days/week    Duration of exercise:  Greater than 60 minutes    Do you usually eat at least 4 servings of fruit and vegetables a day, include whole grains    & fiber and avoid regularly eating high fat or \"junk\" foods?  No    Taking medications regularly:  Yes    Barriers to taking medications:  None    Ability to successfully perform activities of daily living:  No assistance needed    Home Safety:  No safety concerns identified    Hearing Impairment:  Difficulty following a conversation in a noisy restaurant or crowded room and difficulty understanding soft or whispered speech    In the past 6 months, have you been bothered by leaking of urine?  No    In general, how would you rate your overall mental or emotional health?  Excellent    Additional concerns today:  No        Have you ever done Advance Care Planning? (For example, a Health Directive, POLST, or a discussion with a medical provider or your loved ones about your wishes): No, advance care planning information given to patient to review.  Patient declined advance care planning discussion at this time.       Fall risk  Fallen 2 or more times in the past year?: No  Any fall with injury in the past year?: No    Cognitive Screening   1) Repeat 3 items (Leader, Season, Table)    2) Clock draw: NORMAL  3) 3 item recall: Recalls 3 objects  Results: 3 items recalled: COGNITIVE IMPAIRMENT LESS LIKELY    Mini-CogTM Copyright S Brian. Licensed by the author for " use in [facility]; reprinted with permission (elio@[facility]). All rights reserved.      Do you have sleep apnea, excessive snoring or daytime drowsiness? : no    Reviewed and updated as needed this visit by clinical staff    Allergies  Meds              Reviewed and updated as needed this visit by Provider                 Social History     Tobacco Use    Smoking status: Former     Packs/day: 0.50     Years: 5.00     Pack years: 2.50     Types: Cigarettes     Start date: 1966     Quit date: 1970     Years since quittin.7    Smokeless tobacco: Never   Substance Use Topics    Alcohol use: Yes     Comment: 2 drinks daily              9/10/2023     2:35 PM   Alcohol Use   Prescreen: >3 drinks/day or >7 drinks/week? No     Do you have a current opioid prescription? No  Do you use any other controlled substances or medications that are not prescribed by a provider? None        He just had a pacemaker placed a few weeks ago.  He has had longstanding bradycardia, but was becoming symptomatic and was having some episodes of second-degree AV block as well.  He is supposed to take it easy for about 6 weeks.  He is feeling better now with the pacemaker.  He remains on other medication as below.  He came in recently for fasting lab work.  -------------------------------------    Current providers sharing in care for this patient include:   Patient Care Team:  Win Black MD as PCP - General (Family Practice)  Win Black MD as Assigned PCP  Ernesto Xiong MD as MD (Ophthalmology)  Ange Santiago RN as Specialty Care Coordinator (Cardiology)  Cade Marroquin MD as MD (Cardiovascular Disease)  Cade Marroquin MD as Assigned Heart and Vascular Provider  Jonny Hirsch MD as MD (Dermatology)  Ernesto Xiong MD as Assigned Surgical Provider    The following health maintenance items are reviewed in Epic and correct as of today:  Health Maintenance   Topic Date Due    ZOSTER  IMMUNIZATION (2 of 2) 2020    COVID-19 Vaccine (3 - Moderna series) 2021    INFLUENZA VACCINE (1) 2023    ANNUAL REVIEW OF HM ORDERS  2023    MEDICARE ANNUAL WELLNESS VISIT  2023    EYE EXAM  2024    FALL RISK ASSESSMENT  09/15/2024    DTAP/TDAP/TD IMMUNIZATION (3 - Td or Tdap) 2027    ADVANCE CARE PLANNING  2027    LIPID  2028    HEPATITIS C SCREENING  Completed    PHQ-2 (once per calendar year)  Completed    Pneumococcal Vaccine: 65+ Years  Completed    IPV IMMUNIZATION  Aged Out    HPV IMMUNIZATION  Aged Out    MENINGITIS IMMUNIZATION  Aged Out    COLORECTAL CANCER SCREENING  Discontinued     Patient Active Problem List   Diagnosis    Nuclear sclerosis of both eyes    Posterior vitreous detachment of right eye    Pterygium of left eye    History of hip replacement, total, right    History of skin cancer    Sinus bradycardia    Hyperlipidemia LDL goal <70    Coronary artery disease involving native coronary artery of native heart without angina pectoris    S/P placement of cardiac pacemaker     Past Surgical History:   Procedure Laterality Date    ABDOMEN SURGERY      APPENDECTOMY      CARDIAC SURGERY      COLONOSCOPY      ENT SURGERY      Tonsilectomy    EP LOOP RECORDER EXPLANT N/A 2023    Procedure: Loop Recorder Removal;  Surgeon: Cade Marroquin MD;  Location:  HEART CARDI[facility] CATH LAB    EP PACEMAKER DEVICE & LEAD IMPLANT- RIGHT ATRIAL & RIGHT VENTRICULAR N/A 2023    Procedure: Pacemaker Device & Lead Implant - Right Atrial & Right Ventricular;  Surgeon: Cade Marroquin MD;  Location: [facility] CARDIAC CATH LAB    ORTHOPEDIC SURGERY  2017    Right Hip       Social History     Tobacco Use    Smoking status: Former     Packs/day: 0.50     Years: 5.00     Pack years: 2.50     Types: Cigarettes     Start date: 1966     Quit date: 1970     Years since quittin.7    Smokeless tobacco: Never   Substance Use Topics    Alcohol use: Yes  "    Comment: 2 drinks daily      Family History   Problem Relation Age of Onset    Asthma Father     Diabetes Brother     Obesity Brother     Diabetes Brother     Obesity Brother     Glaucoma No family hx of     Macular Degeneration No family hx of          Current Outpatient Medications   Medication Sig Dispense Refill    aspirin (ASA) 81 MG chewable tablet daily      atorvastatin (LIPITOR) 80 MG tablet 1 tablet daily      BRILINTA 60 MG tablet Take 1 tablet by mouth 2 times daily      nitroGLYcerin (NITROSTAT) 0.4 MG sublingual tablet as needed      acetaminophen-codeine (TYLENOL #3) 300-30 MG per tablet Take 1 tablet by mouth every 4 hours as needed for moderate pain 15 tablet 0     No Known Allergies          Review of Systems   Constitutional:  Negative for chills and fever.   HENT:  Negative for congestion, ear pain, hearing loss and sore throat.    Eyes:  Negative for pain and visual disturbance.   Respiratory:  Negative for cough and shortness of breath.    Cardiovascular:  Negative for chest pain, palpitations and peripheral edema.   Gastrointestinal:  Negative for abdominal pain, constipation, diarrhea, heartburn, hematochezia and nausea.   Genitourinary:  Negative for dysuria, frequency, genital sores, hematuria, impotence, penile discharge and urgency.   Musculoskeletal:  Negative for arthralgias, joint swelling and myalgias.   Skin:  Negative for rash.   Neurological:  Negative for dizziness, weakness, headaches and paresthesias.   Psychiatric/Behavioral:  Negative for mood changes. The patient is not nervous/anxious.          OBJECTIVE:   /77   Pulse 50   Temp 97.4  F (36.3  C) (Oral)   Ht 1.756 m (5' 9.13\")   Wt 76.8 kg (169 lb 6.4 oz)   SpO2 99%   BMI 24.92 kg/m   Estimated body mass index is 24.92 kg/m  as calculated from the following:    Height as of this encounter: 1.756 m (5' 9.13\").    Weight as of this encounter: 76.8 kg (169 lb 6.4 oz).  Physical Exam  GENERAL: healthy, alert and " no distress  EYES: Eyes grossly normal to inspection, PERRL and conjunctivae and sclerae normal  HENT: Grossly normal  NECK: no adenopathy, no asymmetry, masses, or scars and thyroid normal to palpation  RESP: lungs clear to auscultation - no rales, rhonchi or wheezes  CV: regular rate and rhythm, normal S1 S2, no S3 or S4, no murmur, click or rub, no peripheral edema and peripheral pulses strong.  He has a pacemaker now in the left upper chest.  ABDOMEN: soft, nontender, no hepatosplenomegaly, no masses   MS: no gross musculoskeletal defects noted, no edema  SKIN: no suspicious lesions or rashes  NEURO: Normal strength and tone, mentation intact and speech normal  PSYCH: mentation appears normal, affect normal/bright    Diagnostic Test Results:  Labs reviewed in Epic  Lab on 09/13/2023   Component Date Value Ref Range Status    Sodium 09/13/2023 136  136 - 145 mmol/L Final    Potassium 09/13/2023 4.2  3.4 - 5.3 mmol/L Final    Chloride 09/13/2023 102  98 - 107 mmol/L Final    Carbon Dioxide (CO2) 09/13/2023 25  22 - 29 mmol/L Final    Anion Gap 09/13/2023 9  7 - 15 mmol/L Final    Urea Nitrogen 09/13/2023 17.4  8.0 - 23.0 mg/dL Final    Creatinine 09/13/2023 1.05  0.67 - 1.17 mg/dL Final    Calcium 09/13/2023 9.2  8.8 - 10.2 mg/dL Final    Glucose 09/13/2023 92  70 - 99 mg/dL Final    GFR Estimate 09/13/2023 73  >60 mL/min/1.73m2 Final    Cholesterol 09/13/2023 142  <200 mg/dL Final    Triglycerides 09/13/2023 70  <150 mg/dL Final    Direct Measure HDL 09/13/2023 67  >=40 mg/dL Final    LDL Cholesterol Calculated 09/13/2023 61  <=100 mg/dL Final    Non HDL Cholesterol 09/13/2023 75  <130 mg/dL Final    WBC Count 09/13/2023 6.7  4.0 - 11.0 10e3/uL Final    RBC Count 09/13/2023 4.90  4.40 - 5.90 10e6/uL Final    Hemoglobin 09/13/2023 16.2  13.3 - 17.7 g/dL Final    Hematocrit 09/13/2023 48.6  40.0 - 53.0 % Final    MCV 09/13/2023 99  78 - 100 fL Final    MCH 09/13/2023 33.1 (H)  26.5 - 33.0 pg Final    MCHC  09/13/2023 33.3  31.5 - 36.5 g/dL Final    RDW 09/13/2023 12.9  10.0 - 15.0 % Final    Platelet Count 09/13/2023 179  150 - 450 10e3/uL Final    ALT 09/13/2023 37  0 - 70 U/L Final    Reference intervals for this test were updated on 6/12/2023 to more accurately reflect our healthy population. There may be differences in the flagging of prior results with similar values performed with this method. Interpretation of those prior results can be made in the context of the updated reference intervals.           ASSESSMENT / PLAN:       ICD-10-CM    1. Encounter for Medicare annual wellness exam  Z00.00       2. Coronary artery disease involving native coronary artery of native heart without angina pectoris  I25.10       3. Hyperlipidemia LDL goal <70  E78.5       4. Symptomatic bradycardia  R00.1       5. Second degree AV block  I44.1       6. S/P placement of cardiac pacemaker  Z95.0         His blood pressure and other vital signs are looking good  His laboratory tests look good as well  We discussed the above conditions  We will continue his same medications  He generally gets up a couple of times a night to urinate and had questions about that  I suggest that he decrease alcohol intake in the evening and consider using some Tylenol PM and/or melatonin for that  Discussed getting vaccines in the next few weeks including a flu shot, COVID vaccine, and RSV vaccine  Continue routine cardiology follow-up  Plan a tentative recheck in 1 year, or sooner prn      COUNSELING:  Reviewed preventive health counseling, as reflected in patient instructions       Regular exercise       Healthy diet/nutrition        He reports that he quit smoking about 53 years ago. His smoking use included cigarettes. He started smoking about 57 years ago. He has a 2.50 pack-year smoking history. He has never used smokeless tobacco.      Appropriate preventive services were discussed with this patient, including applicable screening as appropriate  for cardiovascular disease, diabetes, osteopenia/osteoporosis, and glaucoma.  As appropriate for age/gender, discussed screening for colorectal cancer, prostate cancer, breast cancer, and cervical cancer. Checklist reviewing preventive services available has been given to the patient.    Reviewed patients plan of care and provided an AVS. The Basic Care Plan (routine screening as documented in Health Maintenance) for Aurelio meets the Care Plan requirement. This Care Plan has been established and reviewed with the Patient.          Win Black MD  [facility]

## 2023-09-27 NOTE — PATIENT INSTRUCTIONS
It was a pleasure to see you in clinic today.  Please do not hesitate to call with any questions or concerns.  We look forward to seeing you in clinic at your next device check.    Jennifer Sandoval, RN, BSN  Electrophysiology Nurse Clinician  [facility]    During Business Hours Please Call:  [phone]  After Hours Please Call:  [phone] - select option #[phone] and ask for job code [phone]    Young female with history of hydrocephalus and seizures on Keppra and lacosamide presents to the ED s/p 3 minute seizure. Pt now back to baseline, no concern for infectious etiology as pt has been well. Child well appearing. Will reach out to peds neuro to ensure they can follow up with pt and see wether or not to make an increase or change in medication and will reassess closely.

## 2024-09-17 ENCOUNTER — OFFICE VISIT (OUTPATIENT)
Dept: FAMILY MEDICINE | Facility: CLINIC | Age: 79
End: 2024-09-17
Payer: MEDICARE

## 2024-09-17 VITALS
HEIGHT: 68 IN | SYSTOLIC BLOOD PRESSURE: 135 MMHG | HEART RATE: 60 BPM | WEIGHT: 167 LBS | OXYGEN SATURATION: 99 % | DIASTOLIC BLOOD PRESSURE: 86 MMHG | RESPIRATION RATE: 16 BRPM | BODY MASS INDEX: 25.31 KG/M2 | TEMPERATURE: 98.3 F

## 2024-09-17 DIAGNOSIS — I49.5 SICK SINUS SYNDROME (H): ICD-10-CM

## 2024-09-17 DIAGNOSIS — E78.5 HYPERLIPIDEMIA LDL GOAL <70: ICD-10-CM

## 2024-09-17 DIAGNOSIS — Z95.0 S/P PLACEMENT OF CARDIAC PACEMAKER: ICD-10-CM

## 2024-09-17 DIAGNOSIS — I25.10 CORONARY ARTERY DISEASE INVOLVING NATIVE CORONARY ARTERY OF NATIVE HEART WITHOUT ANGINA PECTORIS: ICD-10-CM

## 2024-09-17 DIAGNOSIS — Z00.00 ENCOUNTER FOR MEDICARE ANNUAL WELLNESS EXAM: Primary | ICD-10-CM

## 2024-09-17 DIAGNOSIS — R10.32 LEFT GROIN PAIN: ICD-10-CM

## 2024-09-17 DIAGNOSIS — G47.00 INSOMNIA, UNSPECIFIED TYPE: ICD-10-CM

## 2024-09-17 LAB
ALT SERPL W P-5'-P-CCNC: 25 U/L (ref 0–70)
ANION GAP SERPL CALCULATED.3IONS-SCNC: 9 MMOL/L (ref 7–15)
BUN SERPL-MCNC: 15.7 MG/DL (ref 8–23)
CALCIUM SERPL-MCNC: 8.9 MG/DL (ref 8.8–10.4)
CHLORIDE SERPL-SCNC: 106 MMOL/L (ref 98–107)
CHOLEST SERPL-MCNC: 129 MG/DL
CREAT SERPL-MCNC: 0.95 MG/DL (ref 0.67–1.17)
EGFRCR SERPLBLD CKD-EPI 2021: 81 ML/MIN/1.73M2
ERYTHROCYTE [DISTWIDTH] IN BLOOD BY AUTOMATED COUNT: 13.3 % (ref 10–15)
FASTING STATUS PATIENT QL REPORTED: YES
FASTING STATUS PATIENT QL REPORTED: YES
GLUCOSE SERPL-MCNC: 100 MG/DL (ref 70–99)
HCO3 SERPL-SCNC: 23 MMOL/L (ref 22–29)
HCT VFR BLD AUTO: 46.3 % (ref 40–53)
HDLC SERPL-MCNC: 63 MG/DL
HGB BLD-MCNC: 15.2 G/DL (ref 13.3–17.7)
LDLC SERPL CALC-MCNC: 53 MG/DL
MCH RBC QN AUTO: 33.5 PG (ref 26.5–33)
MCHC RBC AUTO-ENTMCNC: 32.8 G/DL (ref 31.5–36.5)
MCV RBC AUTO: 102 FL (ref 78–100)
NONHDLC SERPL-MCNC: 66 MG/DL
PLATELET # BLD AUTO: 180 10E3/UL (ref 150–450)
POTASSIUM SERPL-SCNC: 4.6 MMOL/L (ref 3.4–5.3)
RBC # BLD AUTO: 4.54 10E6/UL (ref 4.4–5.9)
SODIUM SERPL-SCNC: 138 MMOL/L (ref 135–145)
TRIGL SERPL-MCNC: 66 MG/DL
WBC # BLD AUTO: 4.7 10E3/UL (ref 4–11)

## 2024-09-17 PROCEDURE — 80048 BASIC METABOLIC PNL TOTAL CA: CPT | Performed by: FAMILY MEDICINE

## 2024-09-17 PROCEDURE — 84460 ALANINE AMINO (ALT) (SGPT): CPT | Performed by: FAMILY MEDICINE

## 2024-09-17 PROCEDURE — 36415 COLL VENOUS BLD VENIPUNCTURE: CPT | Performed by: FAMILY MEDICINE

## 2024-09-17 PROCEDURE — 99213 OFFICE O/P EST LOW 20 MIN: CPT | Mod: 25 | Performed by: FAMILY MEDICINE

## 2024-09-17 PROCEDURE — 80061 LIPID PANEL: CPT | Performed by: FAMILY MEDICINE

## 2024-09-17 PROCEDURE — G0439 PPPS, SUBSEQ VISIT: HCPCS | Performed by: FAMILY MEDICINE

## 2024-09-17 PROCEDURE — 85027 COMPLETE CBC AUTOMATED: CPT | Performed by: FAMILY MEDICINE

## 2024-09-17 ASSESSMENT — PAIN SCALES - GENERAL: PAINLEVEL: NO PAIN (0)

## 2024-09-17 NOTE — PROGRESS NOTES
"Preventive Care Visit  [facility]  Win Black MD, Family Medicine  Sep 17, 2024      Assessment & Plan       ICD-10-CM    1. Encounter for Medicare annual wellness exam  Z00.00       2. Coronary artery disease involving native coronary artery of native heart without angina pectoris  I25.10 Lipid panel reflex to direct LDL Fasting     Basic metabolic panel     CBC with platelets     Lipid panel reflex to direct LDL Fasting     Basic metabolic panel     CBC with platelets      3. S/P placement of cardiac pacemaker  Z95.0       4. Sick sinus syndrome (H)  I49.5       5. Hyperlipidemia LDL goal <70  E78.5 Lipid panel reflex to direct LDL Fasting     ALT     ALT     CANCELED: Lipid panel reflex to direct LDL Fasting      6. Left groin pain  R10.32         Blood pressure and other vital signs look acceptable  We discussed the above items  Will check fasting lab work today as above  Continue same baseline medications  Continue routine cardiology follow-up  I recommended some heat and gentle stretching exercises for the left groin pain  Discussed activity modification and wearing compression shorts as well  Discussed cutting down on alcohol intake in the p.m. and possible use of Tylenol PM as needed for his insomnia  I advised him to get a flu shot and a COVID-vaccine in a month or so  Plan a tentative recheck in 1 year, or sooner prn          BMI  Estimated body mass index is 25.22 kg/m  as calculated from the following:    Height as of this encounter: 1.733 m (5' 8.23\").    Weight as of this encounter: 75.8 kg (167 lb).       Counseling  Appropriate preventive services were addressed with this patient via screening, questionnaire, or discussion as appropriate for fall prevention, nutrition, physical activity, Tobacco-use cessation, social engagement, weight loss and cognition.  Checklist reviewing preventive services available has been given to the patient.  Reviewed patient's diet, addressing " concerns and/or questions.   He is at risk for psychosocial distress and has been provided with information to reduce risk.   The patient reports drinking more than 3 alcoholic drinks per day and/or more than 7 drhnks per week. The patient was counseled and given information about possible harmful effects of excessive alcohol intake.The patient was provided with written information regarding signs of hearing loss.   Information on urinary incontinence and treatment options given to patient.         Subjective   Alex Hassan is a 78 year old, presenting for the following:  Physical        9/17/2024     9:52 AM   Additional Questions   Roomed by cam   Accompanied by none         9/17/2024     9:52 AM   Patient Reported Additional Medications   Patient reports taking the following new medications none         Health Care Directive  Patient does not have a Health Care Directive or Living Will: Discussed advance care planning with patient; information given to patient to review.    HPI    He is generally feeling well.  He is playing tennis 3 times a week and generally works out on 3 other days per week.  He has had some left groin discomfort recently.  No bulge to suggest a hernia.  He remains on baseline medications as below.  He has a pacemaker and sees cardiology for that.  He can generally fall asleep fine, but sometimes wakes up early in the morning and has trouble falling back asleep.  He does typically drink a cocktail around 6 PM and then a glass of wine later in the evening.      9/12/2024   General Health   How would you rate your overall physical health? Excellent   Feel stress (tense, anxious, or unable to sleep) Only a little      (!) STRESS CONCERN      9/12/2024   Nutrition   Diet: Regular (no restrictions)            9/12/2024   Exercise   Days per week of moderate/strenous exercise 6 days   Average minutes spent exercising at this level 80 min            9/12/2024   Social Factors   Frequency of gathering  with friends or relatives Twice a week   Worry food won't last until get money to buy more No   Food not last or not have enough money for food? No   Do you have housing? (Housing is defined as stable permanent housing and does not include staying ouside in a car, in a tent, in an abandoned building, in an overnight shelter, or couch-surfing.) Yes   Are you worried about losing your housing? No   Lack of transportation? No   Unable to get utilities (heat,electricity)? No            9/12/2024   Fall Risk   Fallen 2 or more times in the past year? No    No   Trouble with walking or balance? No    No       Multiple values from one day are sorted in reverse-chronological order          9/12/2024   Activities of Daily Living- Home Safety   Needs help with the following daily activites None of the above   Safety concerns in the home None of the above            9/12/2024   Dental   Dentist two times every year? Yes            9/12/2024   Hearing Screening   Hearing concerns? (!) I FEEL THAT PEOPLE ARE MUMBLING OR NOT SPEAKING CLEARLY.    (!) I NEED TO ASK PEOPLE TO SPEAK UP OR REPEAT THEMSELVES.    (!) IT'S HARD TO FOLLOW A CONVERSATION IN A NOISY RESTAURANT OR CROWDED ROOM.    (!) TROUBLE UNDERSTANDING SOFT OR WHISPERED SPEECH.       Multiple values from one day are sorted in reverse-chronological order         9/12/2024   Driving Risk Screening   Patient/family members have concerns about driving No            9/12/2024   General Alertness/Fatigue Screening   Have you been more tired than usual lately? No            9/12/2024   Urinary Incontinence Screening   Bothered by leaking urine in past 6 months Yes            9/12/2024   TB Screening   Were you born outside of the [place]? No            Today's PHQ-2 Score:       9/16/2024    11:23 AM   PHQ-2 ( 1999 [employer])   Q1: Little interest or pleasure in doing things 0   Q2: Feeling down, depressed or hopeless 0   PHQ-2 Score 0   Q1: Little interest or pleasure in doing things  Not at all   Q2: Feeling down, depressed or hopeless Not at all   PHQ-2 Score 0           2024   Substance Use   Alcohol more than 3/day or more than 7/wk Yes   How often do you have a drink containing alcohol 4 or more times a week   How many alcohol drinks on typical day 1 or 2   How often do you have 5+ drinks at one occasion Never   Audit 2/3 Score 0   How often not able to stop drinking once started Never   How often failed to do what normally expected Never   How often needed first drink in am after a heavy drinking session Never   How often feeling of guilt or remorse after drinking Never   How often unable to remember what happened the night before Never   Have you or someone else been injured because of your drinking No   Has anyone been concerned or suggested you cut down on drinking No   TOTAL SCORE - AUDIT 4   Do you have a current opioid prescription? No   How severe/bad is pain from 1 to 10? 0/10 (No Pain)   Do you use any other substances recreationally? No        Social History     Tobacco Use    Smoking status: Former     Current packs/day: 0.00     Average packs/day: 0.5 packs/day for 5.0 years (2.5 ttl pk-yrs)     Types: Cigarettes     Start date: 1966     Quit date: 1970     Years since quittin.7     Passive exposure: Past    Smokeless tobacco: Never   Vaping Use    Vaping status: Never Used   Substance Use Topics    Alcohol use: Yes     Comment: 2 drinks daily     Drug use: Never       ASCVD Risk   The 10-year ASCVD risk score (Azeem FOSTER, et al., 2019) is: 29.7%    Values used to calculate the score:      Age: 78 years      Sex: Male      Is Non- : No      Diabetic: No      Tobacco smoker: No      Systolic Blood Pressure: 135 mmHg      Is BP treated: No      HDL Cholesterol: 67 mg/dL      Total Cholesterol: 142 mg/dL            Reviewed and updated as needed this visit by Provider                    Patient Active Problem List   Diagnosis     Nuclear sclerosis of both eyes    Posterior vitreous detachment of right eye    Pterygium of left eye    History of hip replacement, total, right    History of skin cancer    Sinus bradycardia    Hyperlipidemia LDL goal <70    Coronary artery disease involving native coronary artery of native heart without angina pectoris    S/P placement of cardiac pacemaker    Sick sinus syndrome (H)    Insomnia, unspecified type     Past Surgical History:   Procedure Laterality Date    ABDOMEN SURGERY      APPENDECTOMY      CARDIAC SURGERY      COLONOSCOPY      ENT SURGERY      Tonsilectomy    EP LOOP RECORDER EXPLANT N/A 2023    Procedure: Loop Recorder Removal;  Surgeon: Cade Marroquin MD;  Location: [facility] CARDIAC CATH LAB    EP PACEMAKER DEVICE & LEAD IMPLANT- RIGHT ATRIAL & RIGHT VENTRICULAR N/A 2023    Procedure: Pacemaker Device & Lead Implant - Right Atrial & Right Ventricular;  Surgeon: Cade Marroquin MD;  Location: [facility] CARDIAC CATH LAB    ORTHOPEDIC SURGERY  2017    Right Hip       Social History     Tobacco Use    Smoking status: Former     Current packs/day: 0.00     Average packs/day: 0.5 packs/day for 5.0 years (2.5 ttl pk-yrs)     Types: Cigarettes     Start date: 1966     Quit date: 1970     Years since quittin.7     Passive exposure: Past    Smokeless tobacco: Never   Substance Use Topics    Alcohol use: Yes     Comment: 2 drinks daily      Family History   Problem Relation Age of Onset    Asthma Father     Diabetes Brother     Obesity Brother     Diabetes Brother     Obesity Brother     Glaucoma No family hx of     Macular Degeneration No family hx of          Current Outpatient Medications   Medication Sig Dispense Refill    aspirin (ASA) 81 MG chewable tablet daily      atorvastatin (LIPITOR) 80 MG tablet 1 tablet daily      BRILINTA 60 MG tablet Take 1 tablet by mouth 2 times daily      nitroGLYcerin (NITROSTAT) 0.4 MG sublingual tablet as needed (Patient not taking:  "Reported on 9/17/2024)       No Known Allergies  Current providers sharing in care for this patient include:  Patient Care Team:  Win Black MD as PCP - General (Family Practice)  Win Black MD as Assigned PCP  Ernesto Xiong MD as MD (Ophthalmology)  Ange Santiago, RN as Specialty Care Coordinator (Cardiology)  Cade Marroquin MD as MD (Cardiovascular Disease)  Cade Marroquin MD as Assigned Heart and Vascular Provider  Jonny Hirsch MD as MD (Dermatology)  Ernesto Xiong MD as Assigned Surgical Provider    The following health maintenance items are reviewed in Epic and correct as of today:  Health Maintenance   Topic Date Due    RSV VACCINE (1 - 1-dose 75+ series) Never done    ZOSTER IMMUNIZATION (2 of 2) 04/14/2020    EYE EXAM  07/21/2024    INFLUENZA VACCINE (1) 09/01/2024    COVID-19 Vaccine (3 - 2024-25 season) 09/01/2024    LIPID  09/13/2024    ANNUAL REVIEW OF HM ORDERS  09/15/2024    MEDICARE ANNUAL WELLNESS VISIT  09/15/2024    FALL RISK ASSESSMENT  09/17/2025    GLUCOSE  09/13/2026    DTAP/TDAP/TD IMMUNIZATION (3 - Td or Tdap) 09/01/2027    ADVANCE CARE PLANNING  09/15/2028    HEPATITIS C SCREENING  Completed    PHQ-2 (once per calendar year)  Completed    Pneumococcal Vaccine: 65+ Years  Completed    HPV IMMUNIZATION  Aged Out    MENINGITIS IMMUNIZATION  Aged Out    RSV MONOCLONAL ANTIBODY  Aged Out    COLORECTAL CANCER SCREENING  Discontinued         Review of Systems  Constitutional, HEENT, cardiovascular, pulmonary, gi and gu systems are negative, except as otherwise noted.     Objective    Exam  /86 (BP Location: Left arm, Patient Position: Sitting, Cuff Size: Adult Regular)   Pulse 60   Temp 98.3  F (36.8  C) (Temporal)   Resp 16   Ht 1.733 m (5' 8.23\")   Wt 75.8 kg (167 lb)   SpO2 99%   BMI 25.22 kg/m     Estimated body mass index is 25.22 kg/m  as calculated from the following:    Height as of this encounter: 1.733 m (5' 8.23\").    Weight as of this " encounter: 75.8 kg (167 lb).    Physical Exam  GENERAL: alert and no distress  EYES: Eyes grossly normal to inspection, PERRL and conjunctivae and sclerae normal  HENT: Grossly normal  NECK: no adenopathy, no asymmetry, masses, or scars  RESP: lungs clear to auscultation - no rales, rhonchi or wheezes  CV: regular rate and rhythm, normal S1 S2, no S3 or S4, no murmur, click or rub, no peripheral edema.  He has a pacemaker in the left upper chest.  ABDOMEN: soft, nontender, no hepatosplenomegaly, no masses   MS: no gross musculoskeletal defects noted, no edema  SKIN: no suspicious lesions or rashes  NEURO: Normal strength and tone, mentation intact and speech normal  PSYCH: mentation appears normal, affect normal/bright        9/17/2024   Mini Cog   Clock Draw Score 2 Normal   3 Item Recall 3 objects recalled   Mini Cog Total Score 5                 Signed Electronically by: Win Black MD

## 2024-09-18 NOTE — RESULT ENCOUNTER NOTE
Alex,  These lab results generally look good.  Liver and kidney tests are normal.  Cholesterol values are excellent.  Blood sugar was just one point above the normal range.  Blood counts look fine.  Please continue your same medication.  I would advise other recheck in 1 year.    Win Black MD

## 2025-01-20 ENCOUNTER — TRANSFERRED RECORDS (OUTPATIENT)
Dept: HEALTH INFORMATION MANAGEMENT | Facility: CLINIC | Age: 80
End: 2025-01-20

## 2025-01-23 ENCOUNTER — TRANSFERRED RECORDS (OUTPATIENT)
Dept: HEALTH INFORMATION MANAGEMENT | Facility: CLINIC | Age: 80
End: 2025-01-23

## 2025-04-23 ENCOUNTER — TRANSFERRED RECORDS (OUTPATIENT)
Dept: HEALTH INFORMATION MANAGEMENT | Facility: CLINIC | Age: 80
End: 2025-04-23

## 2025-05-07 ENCOUNTER — TRANSFERRED RECORDS (OUTPATIENT)
Dept: HEALTH INFORMATION MANAGEMENT | Facility: CLINIC | Age: 80
End: 2025-05-07

## 2025-07-16 ENCOUNTER — MYC MEDICAL ADVICE (OUTPATIENT)
Dept: FAMILY MEDICINE | Facility: CLINIC | Age: 80
End: 2025-07-16
Payer: MEDICARE

## 2025-07-16 NOTE — LETTER
7/16/2025      Aurelio Hassan  [address]  [place] [address]      To [employer],    I provide primary care for the above individual.  He has had severe left hip osteoarthritis and has been unable to participate in tennis or other gym activities this year because of that.  We are requesting a medical leave on his gym membership starting on May 1, 2025 as he anticipates left hip replacement later this year.    Thank you for your consideration.      Sincerely,        Win Black MD    Electronically signed

## 2025-07-22 ENCOUNTER — MYC MEDICAL ADVICE (OUTPATIENT)
Dept: FAMILY MEDICINE | Facility: CLINIC | Age: 80
End: 2025-07-22
Payer: MEDICARE

## 2025-07-22 DIAGNOSIS — M16.12 PRIMARY OSTEOARTHRITIS OF LEFT HIP: Primary | ICD-10-CM

## 2025-07-23 ENCOUNTER — PATIENT OUTREACH (OUTPATIENT)
Dept: CARE COORDINATION | Facility: CLINIC | Age: 80
End: 2025-07-23
Payer: MEDICARE

## 2025-07-29 NOTE — PROGRESS NOTES
ELECTROPHYSIOLOGY CLINIC VISIT    Assessment/Recommendations   Assessment/Plan:    Mr. Hassan is an 79 year old male who has a medical history significant for CAD s/p PCI to LM, ILR implanted in 2020 for bradycardia, presenting to \[facility]\"" care.    CAD s/p LM PCI , s/p CABG 1/2025  ICM LVEF 30-35%, NYHA II  Bradycardia, Sinus Pauses:  1. ACEi/ARB/ARNi: Continue Losartan.  2. BB: Continue Toprol XL  3. Aldosterone antagonist: Continue Spironolactone.  4. SGLT2i: Continue Jardiance  5. Antiplatlet: Continue ASA  6. Statin: Continue Lipitor.   7.  SCD prophylaxis: He had 45 pauses >3 seconds, 23 from January to May 2023 and 4 May into June 2023 noted on ILR. Echo from 2/2023 reported normal LVEF, trace MR, and trace TR. After discussion of benefits and risks of implanting a pacemaker, including the need for higher risk of needing PPM in future and the need for BB for CAD, the patient decided to proceed with PPM implant and ILR removal on 8/21/23. He then had epicardial LV lead placed at time of CABG.  He had epicardial lead connected on 1/2025 reported due to second degree AVB and high RVP % with reduced LVEF. He was placed on GDMT. Follow up echo showed LVEF 30-35% and he had upgrade to CRTD in 5/2025. %. Normal device function, stable lead parameters. He follows with device clinic in [place]. Unclear of RV pacing lead was retained or removed. Will get updated CXR with new device in. Recommend updated echo in 4-5 months.   8. Fluid status: Continue Lasix.   9. Nitroglycerin 0.4 mg PRN for chest pain. Place 1 tablet (0.4 mg) under the tongue every 5 minutes as needed for chest pain. Maximum of 3 doses in 15 minutes.  10. Lifestyle: Avoid tobacco, maintain a healthy weight, limit alcohol, cardiac diet, and exercise.      Follow up annually or sooner if need arises.        History of Present Illness/Subjective    Mr. Aurelio Hassan is an 79 year old male who comes in today for EP consultation of AVB.      Sonya is an 79 year old male who has a medical history significant for CAD s/p PCI to LM s/p CABG 1/2025, ILR implanted in 2020 for bradycardia s/p PPM 8/2023 s/p upgrade to CRTP 1/2025 s/p upgrade to CRTD 5/2025, who presents for follow up.    He has a history of CAD and had PCI to LM. He had a Ziopatch 8/24-9/6/2020 showing 2 episodes of second degree AVB, symptoms not timed, otherwise no arrhythmias. He had an ILR implanted for close monitoring. He has had 45 pauses >3 seconds, 23 from January to May 2023 and 4 May into June 2023 noted on ILR. Last echo from OSH in 2/2023 reported normal LVEF, trace MR, and trace TR.     EP Visit 6/7/23: He reports feeling well. He plays tennis 3-4 times/week and is very active without limitations. He denies chest discomfort, palpitations, abdominal fullness/bloating or peripheral edema, shortness of breath, paroxysmal nocturnal dyspnea, orthopnea, lightheadedness, dizziness, pre-syncope, or syncope. Presenting 12 lead ECG shows SB 1AVB Vent Rate 38 bpm,  ms, QRS 96 ms, QTc 484 ms. Device interrogation shows pause episodes noted as above, normal ILR function. Current cardiac medications include: Brilinta, Lipitor, and ASA.     He presents today for follow up.  He has had 45 pauses >3 seconds, 23 from January to May 2023 and 4 May into June 2023 noted on ILR. Last echo from OSH in 2/2023 reported normal LVEF, trace MR, and trace TR. After discussion of benefits and risks of implanting a pacemaker, including the need for higher risk of needing PPM in future and the need for BB for CAD, the patient decided to proceed with PPM implant and ILR removal on 8/21/23. He then had a CABG in [place] in 1/2025. He had perioperative VF arrest due to failed vein graft. He also had an epicardial LV lead placed during the surgery. His echo showed LVEF 20-25% preoperatively. He had epicardial lead connected on 1/2025 reported due to second degree AVB and high RVP % with reduced LVEF. He was  placed on GDMT. Follow up echo showed LVEF 30-35% and he had upgrade to CRTD in 5/2025. He reports feeling well. He is recovering well post operatively. He denies chest discomfort, palpitations, abdominal fullness/bloating or peripheral edema, shortness of breath, paroxysmal nocturnal dyspnea, orthopnea, lightheadedness, dizziness, pre-syncope, or syncope. Device interrogation shows normal device function, stable lead parameters, no arrhythmias recorded, and %, %. Presenting 12 lead ECG shows  Vent Rate 70 bpm,  ms,  ms, QTc 531 ms. Current cardiac medications include: Lipitor, Lasix, Jardiance, Losartan, Toprol XL, Spironolactone, ASA, and Eliquis.       I have reviewed and updated the patient's Past Medical History, Social History, Family History and Medication List.     Cardiographics (Personally Reviewed) :   Echo OSH Feb 2023:   LVEF 55-60%, trace MR, trace TR.    TTE 2017:   Final Conclusion    1. Normal left ventricular chamber size and systolic function. Estimated left ventricular ejection fraction is 65-70%.    2. No regional wall motion abnormalities. Normal left ventricular wall thickness.    3. Normal right ventricular size and systolic function.    4. No significant valvular heart disease.          Physical Examination   /69 (BP Location: Right arm, Patient Position: Chair, Cuff Size: Adult Regular)   Pulse 69   Wt 73.7 kg (162 lb 8 oz)   BMI 24.54 kg/m    Wt Readings from Last 3 Encounters:   09/17/24 75.8 kg (167 lb)   09/15/23 76.8 kg (169 lb 6.4 oz)   08/21/23 74.8 kg (164 lb 14.5 oz)       CONSITUTIONAL: no acute distress  HEENT: no icterus, no redness or discharge, neck supple  CV: no visible edema of visualized extremities. No JVD.   RESPIRATORY: respirations nonlabored, no cough  NEURO: AA&Ox3, speech fluent/appropriate, motor grossly nonfocal  PSYCH: cooperative, affect appropriate  DERM: no rashes on visualized face/neck/upper extremities    "      Medications  Allergies   Current Outpatient Medications   Medication Sig Dispense Refill    aspirin (ASA) 81 MG chewable tablet daily      atorvastatin (LIPITOR) 80 MG tablet 1 tablet daily      BRILINTA 60 MG tablet Take 1 tablet by mouth 2 times daily      nitroGLYcerin (NITROSTAT) 0.4 MG sublingual tablet as needed (Patient not taking: Reported on 9/17/2024)      No Known Allergies      Lab Results (Personally Reviewed)    Chemistry/lipid CBC Cardiac Enzymes/BNP/TSH/INR   Lab Results   Component Value Date    BUN 15.7 09/17/2024     09/17/2024    CO2 23 09/17/2024     Creatinine   Date Value Ref Range Status   09/17/2024 0.95 0.67 - 1.17 mg/dL Final   08/18/2020 1.04 0.66 - 1.25 mg/dL Final       Lab Results   Component Value Date    CHOL 129 09/17/2024    HDL 63 09/17/2024    LDL 53 09/17/2024      Lab Results   Component Value Date    WBC 4.7 09/17/2024    HGB 15.2 09/17/2024    HCT 46.3 09/17/2024     (H) 09/17/2024     09/17/2024    No results found for: \"CKTOTAL\", \"CKMB\", \"TROPONINI\", \"BNP\", \"TSH\", \"INR\"     The patient states understanding and is agreeable with the plan.   AGATHA Farah CNP  Electrophysiology Consult Service  Securely message with Synchronized   Text page via [facility] Paging/Directory                  "

## 2025-07-30 ENCOUNTER — ANCILLARY PROCEDURE (OUTPATIENT)
Dept: CARDIOLOGY | Facility: CLINIC | Age: 80
End: 2025-07-30
Attending: NURSE PRACTITIONER
Payer: MEDICARE

## 2025-07-30 ENCOUNTER — ANCILLARY PROCEDURE (OUTPATIENT)
Dept: GENERAL RADIOLOGY | Facility: CLINIC | Age: 80
End: 2025-07-30
Attending: NURSE PRACTITIONER
Payer: MEDICARE

## 2025-07-30 VITALS
DIASTOLIC BLOOD PRESSURE: 69 MMHG | WEIGHT: 162.5 LBS | BODY MASS INDEX: 24.54 KG/M2 | SYSTOLIC BLOOD PRESSURE: 100 MMHG | HEART RATE: 69 BPM

## 2025-07-30 DIAGNOSIS — Z95.810 BIVENTRICULAR IMPLANTABLE CARDIOVERTER-DEFIBRILLATOR (ICD) IN SITU: ICD-10-CM

## 2025-07-30 DIAGNOSIS — I25.5 ISCHEMIC CARDIOMYOPATHY: ICD-10-CM

## 2025-07-30 DIAGNOSIS — I25.10 CORONARY ARTERY DISEASE INVOLVING NATIVE CORONARY ARTERY OF NATIVE HEART WITHOUT ANGINA PECTORIS: ICD-10-CM

## 2025-07-30 DIAGNOSIS — Z95.810 BIVENTRICULAR IMPLANTABLE CARDIOVERTER-DEFIBRILLATOR (ICD) IN SITU: Primary | ICD-10-CM

## 2025-07-30 LAB
MDC_IDC_LEAD_CONNECTION_STATUS: NORMAL
MDC_IDC_LEAD_IMPLANT_DT: NORMAL
MDC_IDC_LEAD_LOCATION: NORMAL
MDC_IDC_LEAD_LOCATION_DETAIL_1: NORMAL
MDC_IDC_LEAD_MFG: NORMAL
MDC_IDC_LEAD_MODEL: NORMAL
MDC_IDC_LEAD_POLARITY_TYPE: NORMAL
MDC_IDC_LEAD_SERIAL: NORMAL
MDC_IDC_MSMT_BATTERY_REMAINING_LONGEVITY: 116 MO
MDC_IDC_MSMT_BATTERY_REMAINING_PERCENTAGE: 100 %
MDC_IDC_MSMT_BATTERY_STATUS: NORMAL
MDC_IDC_MSMT_CAP_CHARGE_ENERGY: 0 J
MDC_IDC_MSMT_CAP_CHARGE_TIME: 0 S
MDC_IDC_MSMT_CAP_CHARGE_TYPE: NORMAL
MDC_IDC_MSMT_LEADCHNL_LV_IMPEDANCE_VALUE: 395 OHM
MDC_IDC_MSMT_LEADCHNL_LV_PACING_THRESHOLD_AMPLITUDE: 1.1 V
MDC_IDC_MSMT_LEADCHNL_LV_PACING_THRESHOLD_PULSEWIDTH: 1 MS
MDC_IDC_MSMT_LEADCHNL_RA_IMPEDANCE_VALUE: 507 OHM
MDC_IDC_MSMT_LEADCHNL_RA_PACING_THRESHOLD_AMPLITUDE: 1 V
MDC_IDC_MSMT_LEADCHNL_RA_PACING_THRESHOLD_PULSEWIDTH: 0.4 MS
MDC_IDC_MSMT_LEADCHNL_RV_IMPEDANCE_VALUE: 539 OHM
MDC_IDC_MSMT_LEADCHNL_RV_PACING_THRESHOLD_AMPLITUDE: 0.5 V
MDC_IDC_MSMT_LEADCHNL_RV_PACING_THRESHOLD_PULSEWIDTH: 0.4 MS
MDC_IDC_PG_IMPLANT_DTM: NORMAL
MDC_IDC_PG_MFG: NORMAL
MDC_IDC_PG_MODEL: NORMAL
MDC_IDC_PG_SERIAL: NORMAL
MDC_IDC_PG_TYPE: NORMAL
MDC_IDC_SESS_CLINIC_NAME: NORMAL
MDC_IDC_SESS_DTM: NORMAL
MDC_IDC_SESS_TYPE: NORMAL
MDC_IDC_SET_BRADY_AT_MODE_SWITCH_MODE: NORMAL
MDC_IDC_SET_BRADY_AT_MODE_SWITCH_RATE: 150 {BEATS}/MIN
MDC_IDC_SET_BRADY_LOWRATE: 70 {BEATS}/MIN
MDC_IDC_SET_BRADY_MAX_SENSOR_RATE: 130 {BEATS}/MIN
MDC_IDC_SET_BRADY_MAX_TRACKING_RATE: 130 {BEATS}/MIN
MDC_IDC_SET_BRADY_MODE: NORMAL
MDC_IDC_SET_BRADY_PAV_DELAY_HIGH: 140 MS
MDC_IDC_SET_BRADY_PAV_DELAY_LOW: 180 MS
MDC_IDC_SET_BRADY_SAV_DELAY_HIGH: 115 MS
MDC_IDC_SET_BRADY_SAV_DELAY_LOW: 150 MS
MDC_IDC_SET_CRT_LVRV_DELAY: 20 MS
MDC_IDC_SET_CRT_PACED_CHAMBERS: NORMAL
MDC_IDC_SET_LEADCHNL_LV_PACING_AMPLITUDE: 2 V
MDC_IDC_SET_LEADCHNL_LV_PACING_ANODE_ELECTRODE_1: NORMAL
MDC_IDC_SET_LEADCHNL_LV_PACING_ANODE_LOCATION_1: NORMAL
MDC_IDC_SET_LEADCHNL_LV_PACING_CAPTURE_MODE: NORMAL
MDC_IDC_SET_LEADCHNL_LV_PACING_CATHODE_ELECTRODE_1: NORMAL
MDC_IDC_SET_LEADCHNL_LV_PACING_CATHODE_LOCATION_1: NORMAL
MDC_IDC_SET_LEADCHNL_LV_PACING_POLARITY: NORMAL
MDC_IDC_SET_LEADCHNL_LV_PACING_PULSEWIDTH: 1 MS
MDC_IDC_SET_LEADCHNL_LV_SENSING_ADAPTATION_MODE: NORMAL
MDC_IDC_SET_LEADCHNL_LV_SENSING_ANODE_ELECTRODE_1: NORMAL
MDC_IDC_SET_LEADCHNL_LV_SENSING_ANODE_LOCATION_1: NORMAL
MDC_IDC_SET_LEADCHNL_LV_SENSING_CATHODE_ELECTRODE_1: NORMAL
MDC_IDC_SET_LEADCHNL_LV_SENSING_CATHODE_LOCATION_1: NORMAL
MDC_IDC_SET_LEADCHNL_LV_SENSING_POLARITY: NORMAL
MDC_IDC_SET_LEADCHNL_LV_SENSING_SENSITIVITY: 1 MV
MDC_IDC_SET_LEADCHNL_RA_PACING_AMPLITUDE: 2 V
MDC_IDC_SET_LEADCHNL_RA_PACING_CAPTURE_MODE: NORMAL
MDC_IDC_SET_LEADCHNL_RA_PACING_POLARITY: NORMAL
MDC_IDC_SET_LEADCHNL_RA_PACING_PULSEWIDTH: 0.4 MS
MDC_IDC_SET_LEADCHNL_RA_SENSING_ADAPTATION_MODE: NORMAL
MDC_IDC_SET_LEADCHNL_RA_SENSING_POLARITY: NORMAL
MDC_IDC_SET_LEADCHNL_RA_SENSING_SENSITIVITY: 0.25 MV
MDC_IDC_SET_LEADCHNL_RV_PACING_AMPLITUDE: 2 V
MDC_IDC_SET_LEADCHNL_RV_PACING_CAPTURE_MODE: NORMAL
MDC_IDC_SET_LEADCHNL_RV_PACING_POLARITY: NORMAL
MDC_IDC_SET_LEADCHNL_RV_PACING_PULSEWIDTH: 0.4 MS
MDC_IDC_SET_LEADCHNL_RV_SENSING_ADAPTATION_MODE: NORMAL
MDC_IDC_SET_LEADCHNL_RV_SENSING_POLARITY: NORMAL
MDC_IDC_SET_LEADCHNL_RV_SENSING_SENSITIVITY: 0.6 MV
MDC_IDC_SET_ZONE_DETECTION_INTERVAL: 273 MS
MDC_IDC_SET_ZONE_DETECTION_INTERVAL: 333 MS
MDC_IDC_SET_ZONE_DETECTION_INTERVAL: 400 MS
MDC_IDC_SET_ZONE_STATUS: NORMAL
MDC_IDC_SET_ZONE_TYPE: NORMAL
MDC_IDC_SET_ZONE_VENDOR_TYPE: NORMAL
MDC_IDC_STAT_BRADY_DTM_END: NORMAL
MDC_IDC_STAT_BRADY_DTM_START: NORMAL
MDC_IDC_STAT_BRADY_RA_PERCENT_PACED: 100 %
MDC_IDC_STAT_BRADY_RV_PERCENT_PACED: 96 %
MDC_IDC_STAT_CRT_LV_PERCENT_PACED: 96 %
MDC_IDC_STAT_EPISODE_RECENT_COUNT: 0
MDC_IDC_STAT_EPISODE_RECENT_COUNT_DTM_END: NORMAL
MDC_IDC_STAT_EPISODE_RECENT_COUNT_DTM_START: NORMAL
MDC_IDC_STAT_EPISODE_TOTAL_COUNT: 0
MDC_IDC_STAT_EPISODE_TOTAL_COUNT_DTM_END: NORMAL
MDC_IDC_STAT_EPISODE_TYPE: NORMAL
MDC_IDC_STAT_EPISODE_VENDOR_TYPE: NORMAL
MDC_IDC_STAT_TACHYTHERAPY_ATP_DELIVERED_RECENT: 0
MDC_IDC_STAT_TACHYTHERAPY_ATP_DELIVERED_TOTAL: 0
MDC_IDC_STAT_TACHYTHERAPY_RECENT_DTM_END: NORMAL
MDC_IDC_STAT_TACHYTHERAPY_RECENT_DTM_START: NORMAL
MDC_IDC_STAT_TACHYTHERAPY_SHOCKS_ABORTED_RECENT: 0
MDC_IDC_STAT_TACHYTHERAPY_SHOCKS_ABORTED_TOTAL: 0
MDC_IDC_STAT_TACHYTHERAPY_SHOCKS_DELIVERED_RECENT: 0
MDC_IDC_STAT_TACHYTHERAPY_SHOCKS_DELIVERED_TOTAL: 0
MDC_IDC_STAT_TACHYTHERAPY_TOTAL_DTM_END: NORMAL

## 2025-07-30 PROCEDURE — 93284 PRGRMG EVAL IMPLANTABLE DFB: CPT | Performed by: INTERNAL MEDICINE

## 2025-07-30 PROCEDURE — 71046 X-RAY EXAM CHEST 2 VIEWS: CPT | Mod: GC | Performed by: RADIOLOGY

## 2025-07-30 PROCEDURE — G0463 HOSPITAL OUTPT CLINIC VISIT: HCPCS | Performed by: NURSE PRACTITIONER

## 2025-07-30 PROCEDURE — 99214 OFFICE O/P EST MOD 30 MIN: CPT | Mod: 25 | Performed by: NURSE PRACTITIONER

## 2025-07-30 PROCEDURE — 3074F SYST BP LT 130 MM HG: CPT | Performed by: NURSE PRACTITIONER

## 2025-07-30 PROCEDURE — 3078F DIAST BP <80 MM HG: CPT | Performed by: NURSE PRACTITIONER

## 2025-07-30 PROCEDURE — 1126F AMNT PAIN NOTED NONE PRSNT: CPT | Performed by: NURSE PRACTITIONER

## 2025-07-30 RX ORDER — DAPAGLIFLOZIN 10 MG/1
10 TABLET, FILM COATED ORAL DAILY
COMMUNITY
Start: 2025-01-31

## 2025-07-30 RX ORDER — SPIRONOLACTONE 25 MG/1
25 TABLET ORAL DAILY
COMMUNITY
Start: 2025-01-07

## 2025-07-30 RX ORDER — FUROSEMIDE 20 MG/1
20 TABLET ORAL DAILY
COMMUNITY
Start: 2025-01-07

## 2025-07-30 RX ORDER — APIXABAN 5 MG/1
5 TABLET, FILM COATED ORAL 2 TIMES DAILY
COMMUNITY
Start: 2025-06-02

## 2025-07-30 RX ORDER — METOPROLOL SUCCINATE 25 MG/1
25 TABLET, EXTENDED RELEASE ORAL DAILY
COMMUNITY
Start: 2025-01-07

## 2025-07-30 RX ORDER — AMIODARONE HYDROCHLORIDE 200 MG/1
200 TABLET ORAL DAILY
COMMUNITY
Start: 2025-01-07

## 2025-07-30 RX ORDER — FAMOTIDINE 20 MG/1
20 TABLET, FILM COATED ORAL ONCE
COMMUNITY
Start: 2025-01-07

## 2025-07-30 RX ORDER — LOSARTAN POTASSIUM 25 MG/1
25 TABLET ORAL DAILY
COMMUNITY
Start: 2025-01-07

## 2025-07-30 ASSESSMENT — PAIN SCALES - GENERAL: PAINLEVEL_OUTOF10: NO PAIN (0)

## 2025-07-30 NOTE — NURSING NOTE
Chief Complaint   Patient presents with    Follow Up     7/30/25 Rosenda Esparza NP  2 mo follow-up upgrade to CRTD (5/16/25) in [place]. VF - on amio.  1/2025 CABG--> VF perioperatively. 90 days post revascularization and on optimal GDMT --> upgrade.          Vitals were taken, medications reconciled and EKG performed.    Valentino Cantu, SHARI    9:56 AM

## 2025-07-30 NOTE — LETTER
7/30/2025      RE: Aurelio Hassan  [address]  [facility] [address]       Dear Colleague,    Thank you for the opportunity to participate in the care of your patient, Aurelio Hassan, at the [facility] [place] at [facility]. Please see a copy of my visit note below.        ELECTROPHYSIOLOGY CLINIC VISIT    Assessment/Recommendations   Assessment/Plan:    Mr. 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    CAD s/p LM PCI , s/p CABG 1/2025  ICM LVEF 30-35%, NYHA II  Bradycardia, Sinus Pauses:  1. ACEi/ARB/ARNi: Continue Losartan.  2. BB: Continue Toprol XL  3. Aldosterone antagonist: Continue Spironolactone.  4. SGLT2i: Continue Jardiance  5. Antiplatlet: Continue ASA  6. Statin: Continue Lipitor.   7.  SCD prophylaxis: He had 45 pauses >3 seconds, 23 from January to May 2023 and 4 May into June 2023 noted on ILR. Echo from 2/2023 reported normal LVEF, trace MR, and trace TR. After discussion of benefits and risks of implanting a pacemaker, including the need for higher risk of needing PPM in future and the need for BB for CAD, the patient decided to proceed with PPM implant and ILR removal on 8/21/23. He then had epicardial LV lead placed at time of CABG.  He had epicardial lead connected on 1/2025 reported due to second degree AVB and high RVP % with reduced LVEF. He was placed on GDMT. Follow up echo showed LVEF 30-35% and he had upgrade to CRTD in 5/2025. %. Normal device function, stable lead parameters. He follows with device clinic in [place]. Unclear of RV pacing lead was retained or removed. Will get updated CXR with new device in. Recommend updated echo in 4-5 months.   8. Fluid status: Continue Lasix.   9. Nitroglycerin 0.4 mg PRN for chest pain. Place 1 tablet (0.4 mg) under the tongue every 5 minutes as needed for  chest pain. Maximum of 3 doses in 15 minutes.  10. Lifestyle: Avoid tobacco, maintain a healthy weight, limit alcohol, cardiac diet, and exercise.      Follow up annually or sooner if need arises.        History of Present Illness/Subjective    Mr. Aurelio Hassan is an 79 year old male who comes in today for EP consultation of AVB.    Mr. 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    He has a history of CAD and had PCI to LM. He had a Ziopatch 8/24-9/6/2020 showing 2 episodes of second degree AVB, symptoms not timed, otherwise no arrhythmias. He had an ILR implanted for close monitoring. He has had 45 pauses >3 seconds, 23 from January to May 2023 and 4 May into June 2023 noted on ILR. Last echo from OSH in 2/2023 reported normal LVEF, trace MR, and trace TR.     EP Visit 6/7/23: He reports feeling well. He plays tennis 3-4 times/week and is very active without limitations. He denies chest discomfort, palpitations, abdominal fullness/bloating or peripheral edema, shortness of breath, paroxysmal nocturnal dyspnea, orthopnea, lightheadedness, dizziness, pre-syncope, or syncope. Presenting 12 lead ECG shows SB 1AVB Vent Rate 38 bpm,  ms, QRS 96 ms, QTc 484 ms. Device interrogation shows pause episodes noted as above, normal ILR function. Current cardiac medications include: Brilinta, Lipitor, and ASA.     He presents today for follow up.  He has had 45 pauses >3 seconds, 23 from January to May 2023 and 4 May into June 2023 noted on ILR. Last echo from OSH in 2/2023 reported normal LVEF, trace MR, and trace TR. After discussion of benefits and risks of implanting a pacemaker, including the need for higher risk of needing PPM in future and the need for BB for CAD, the patient decided to proceed with PPM implant and  ILR removal on 8/21/23. He then had a CABG in [place] in 1/2025. He had perioperative VF arrest due to failed vein graft. He also had an epicardial LV lead placed during the surgery. His echo showed LVEF 20-25% preoperatively. He had epicardial lead connected on 1/2025 reported due to second degree AVB and high RVP % with reduced LVEF. He was placed on GDMT. Follow up echo showed LVEF 30-35% and he had upgrade to CRTD in 5/2025. He reports feeling well. He is recovering well post operatively. He denies chest discomfort, palpitations, abdominal fullness/bloating or peripheral edema, shortness of breath, paroxysmal nocturnal dyspnea, orthopnea, lightheadedness, dizziness, pre-syncope, or syncope. Device interrogation shows normal device function, stable lead parameters, no arrhythmias recorded, and %, %. Presenting 12 lead ECG shows  Vent Rate 70 bpm,  ms,  ms, QTc 531 ms. Current cardiac medications include: Lipitor, Lasix, Jardiance, Losartan, Toprol XL, Spironolactone, ASA, and Eliquis.       I have reviewed and updated the patient's Past Medical History, Social History, Family History and Medication List.     Cardiographics (Personally Reviewed) :   Echo OSH Feb 2023:   LVEF 55-60%, trace MR, trace TR.    TTE 2017:   Final Conclusion    1. Normal left ventricular chamber size and systolic function. Estimated left ventricular ejection fraction is 65-70%.    2. No regional wall motion abnormalities. Normal left ventricular wall thickness.    3. Normal right ventricular size and systolic function.    4. No significant valvular heart disease.          Physical Examination   /69 (BP Location: Right arm, Patient Position: Chair, Cuff Size: Adult Regular)   Pulse 69   Wt 73.7 kg (162 lb 8 oz)   BMI 24.54 kg/m    Wt Readings from Last 3 Encounters:   09/17/24 75.8 kg (167 lb)   09/15/23 76.8 kg (169 lb 6.4 oz)   08/21/23 74.8 kg (164 lb 14.5 oz)       CONSITUTIONAL: no acute  "distress  HEENT: no icterus, no redness or discharge, neck supple  CV: no visible edema of visualized extremities. No JVD.   RESPIRATORY: respirations nonlabored, no cough  NEURO: AA&Ox3, speech fluent/appropriate, motor grossly nonfocal  PSYCH: cooperative, affect appropriate  DERM: no rashes on visualized face/neck/upper extremities         Medications  Allergies   Current Outpatient Medications   Medication Sig Dispense Refill     aspirin (ASA) 81 MG chewable tablet daily       atorvastatin (LIPITOR) 80 MG tablet 1 tablet daily       BRILINTA 60 MG tablet Take 1 tablet by mouth 2 times daily       nitroGLYcerin (NITROSTAT) 0.4 MG sublingual tablet as needed (Patient not taking: Reported on 9/17/2024)      No Known Allergies      Lab Results (Personally Reviewed)    Chemistry/lipid CBC Cardiac Enzymes/BNP/TSH/INR   Lab Results   Component Value Date    BUN 15.7 09/17/2024     09/17/2024    CO2 23 09/17/2024     Creatinine   Date Value Ref Range Status   09/17/2024 0.95 0.67 - 1.17 mg/dL Final   08/18/2020 1.04 0.66 - 1.25 mg/dL Final       Lab Results   Component Value Date    CHOL 129 09/17/2024    HDL 63 09/17/2024    LDL 53 09/17/2024      Lab Results   Component Value Date    WBC 4.7 09/17/2024    HGB 15.2 09/17/2024    HCT 46.3 09/17/2024     (H) 09/17/2024     09/17/2024    No results found for: \"CKTOTAL\", \"CKMB\", \"TROPONINI\", \"BNP\", \"TSH\", \"INR\"     The patient states understanding and is agreeable with the plan.   AGATHA Farah CNP  Electrophysiology Consult Service  Securely message with [employer]   Text page via [employer] Paging/Directory                    Please do not hesitate to contact me if you have any questions/concerns.     Sincerely,     AGATHA Barragan CNP  "

## 2025-07-31 SDOH — HEALTH STABILITY: PHYSICAL HEALTH: ON AVERAGE, HOW MANY DAYS PER WEEK DO YOU ENGAGE IN MODERATE TO STRENUOUS EXERCISE (LIKE A BRISK WALK)?: 4 DAYS

## 2025-07-31 SDOH — HEALTH STABILITY: PHYSICAL HEALTH: ON AVERAGE, HOW MANY MINUTES DO YOU ENGAGE IN EXERCISE AT THIS LEVEL?: 30 MIN

## 2025-08-05 ENCOUNTER — ANCILLARY PROCEDURE (OUTPATIENT)
Dept: ULTRASOUND IMAGING | Facility: HOSPITAL | Age: 80
End: 2025-08-05
Attending: STUDENT IN AN ORGANIZED HEALTH CARE EDUCATION/TRAINING PROGRAM
Payer: MEDICARE

## 2025-08-05 ENCOUNTER — ANCILLARY PROCEDURE (OUTPATIENT)
Dept: GENERAL RADIOLOGY | Facility: CLINIC | Age: 80
End: 2025-08-05
Attending: STUDENT IN AN ORGANIZED HEALTH CARE EDUCATION/TRAINING PROGRAM
Payer: MEDICARE

## 2025-08-05 ENCOUNTER — OFFICE VISIT (OUTPATIENT)
Dept: ORTHOPEDICS | Facility: CLINIC | Age: 80
End: 2025-08-05
Attending: FAMILY MEDICINE
Payer: MEDICARE

## 2025-08-05 DIAGNOSIS — M16.12 PRIMARY OSTEOARTHRITIS OF LEFT HIP: ICD-10-CM

## 2025-08-05 DIAGNOSIS — M16.12 PRIMARY OSTEOARTHRITIS OF LEFT HIP: Primary | ICD-10-CM

## 2025-08-05 PROCEDURE — 99203 OFFICE O/P NEW LOW 30 MIN: CPT | Mod: 25 | Performed by: STUDENT IN AN ORGANIZED HEALTH CARE EDUCATION/TRAINING PROGRAM

## 2025-08-05 PROCEDURE — 73502 X-RAY EXAM HIP UNI 2-3 VIEWS: CPT | Mod: TC | Performed by: STUDENT IN AN ORGANIZED HEALTH CARE EDUCATION/TRAINING PROGRAM

## 2025-08-05 PROCEDURE — 20611 DRAIN/INJ JOINT/BURSA W/US: CPT | Mod: LT | Performed by: STUDENT IN AN ORGANIZED HEALTH CARE EDUCATION/TRAINING PROGRAM

## 2025-08-05 RX ORDER — LIDOCAINE HYDROCHLORIDE 10 MG/ML
3 INJECTION, SOLUTION INFILTRATION; PERINEURAL
Status: COMPLETED | OUTPATIENT
Start: 2025-08-05 | End: 2025-08-05

## 2025-08-05 RX ORDER — ROPIVACAINE HYDROCHLORIDE 2 MG/ML
2 INJECTION, SOLUTION EPIDURAL; INFILTRATION; PERINEURAL ONCE
Status: COMPLETED | OUTPATIENT
Start: 2025-08-05 | End: 2025-08-05

## 2025-08-05 RX ORDER — TRIAMCINOLONE ACETONIDE 40 MG/ML
40 INJECTION, SUSPENSION INTRA-ARTICULAR; INTRAMUSCULAR
Status: COMPLETED | OUTPATIENT
Start: 2025-08-05 | End: 2025-08-05

## 2025-08-05 RX ADMIN — TRIAMCINOLONE ACETONIDE 40 MG: 40 INJECTION, SUSPENSION INTRA-ARTICULAR; INTRAMUSCULAR at 13:49

## 2025-08-05 RX ADMIN — ROPIVACAINE HYDROCHLORIDE 2 ML: 2 INJECTION, SOLUTION EPIDURAL; INFILTRATION; PERINEURAL at 13:49

## 2025-08-05 RX ADMIN — LIDOCAINE HYDROCHLORIDE 3 ML: 10 INJECTION, SOLUTION INFILTRATION; PERINEURAL at 13:49

## 2025-08-19 ENCOUNTER — OFFICE VISIT (OUTPATIENT)
Dept: OPHTHALMOLOGY | Facility: CLINIC | Age: 80
End: 2025-08-19
Payer: MEDICARE

## 2025-08-19 DIAGNOSIS — H52.4 HYPEROPIA OF BOTH EYES WITH ASTIGMATISM AND PRESBYOPIA: Primary | ICD-10-CM

## 2025-08-19 DIAGNOSIS — H52.203 HYPEROPIA OF BOTH EYES WITH ASTIGMATISM AND PRESBYOPIA: Primary | ICD-10-CM

## 2025-08-19 DIAGNOSIS — H18.003 CORNEA VERTICILLATA OF BOTH EYES: ICD-10-CM

## 2025-08-19 DIAGNOSIS — H01.01A ULCERATIVE BLEPHARITIS OF UPPER AND LOWER EYELIDS OF BOTH EYES: ICD-10-CM

## 2025-08-19 DIAGNOSIS — H01.01B ULCERATIVE BLEPHARITIS OF UPPER AND LOWER EYELIDS OF BOTH EYES: ICD-10-CM

## 2025-08-19 DIAGNOSIS — H25.813 COMBINED FORMS OF AGE-RELATED CATARACT OF BOTH EYES: ICD-10-CM

## 2025-08-19 DIAGNOSIS — H52.03 HYPEROPIA OF BOTH EYES WITH ASTIGMATISM AND PRESBYOPIA: Primary | ICD-10-CM

## 2025-08-19 DIAGNOSIS — H33.301 OPERCULUM OF RETINA OF RIGHT EYE: ICD-10-CM

## 2025-08-19 DIAGNOSIS — H35.30 ARMD (AGE-RELATED MACULAR DEGENERATION), BILATERAL: ICD-10-CM

## 2025-08-19 PROCEDURE — 92014 COMPRE OPH EXAM EST PT 1/>: CPT | Performed by: OPHTHALMOLOGY

## 2025-08-19 PROCEDURE — 92015 DETERMINE REFRACTIVE STATE: CPT | Mod: GY | Performed by: OPHTHALMOLOGY

## 2025-08-19 ASSESSMENT — REFRACTION_WEARINGRX
OD_AXIS: 003
SPECS_TYPE: PAL
OS_AXIS: 009
OS_ADD: +1.25
OD_CYLINDER: SPHERE
OS_CYLINDER: +0.50
OS_CYLINDER: SPHERE
OS_SPHERE: +1.25
OS_SPHERE: +2.50
OD_CYLINDER: +0.50
SPECS_TYPE: SVL
OD_SPHERE: +2.50
OD_SPHERE: +1.50
OD_ADD: +1.25

## 2025-08-19 ASSESSMENT — VISUAL ACUITY
OS_SC: 20/40
OD_PH_SC: 20/30
OD_SC: 20/40
CORRECTION_TYPE: GLASSES
OS_PH_SC: 20/30
OD_SC+: -2
OD_PH_SC+: -2
METHOD: SNELLEN - LINEAR
OS_PH_SC+: -1

## 2025-08-19 ASSESSMENT — TONOMETRY
IOP_METHOD: ICARE
OD_IOP_MMHG: 16
OS_IOP_MMHG: 15

## 2025-08-19 ASSESSMENT — EXTERNAL EXAM - RIGHT EYE: OD_EXAM: NORMAL

## 2025-08-19 ASSESSMENT — REFRACTION_MANIFEST
OD_SPHERE: +0.75
OD_ADD: +2.75
OS_AXIS: 040
OS_ADD: +2.75
OD_AXIS: 005
OD_CYLINDER: +0.50
OS_CYLINDER: +0.50
OS_SPHERE: +0.25

## 2025-08-19 ASSESSMENT — CONF VISUAL FIELD
METHOD: COUNTING FINGERS
COMMENTS: PTOSIS/DERMATOCHALASIS
OD_INFERIOR_NASAL_RESTRICTION: 0
OS_INFERIOR_TEMPORAL_RESTRICTION: 0
OS_SUPERIOR_TEMPORAL_RESTRICTION: 3
OD_INFERIOR_TEMPORAL_RESTRICTION: 0
OS_SUPERIOR_NASAL_RESTRICTION: 3
OD_SUPERIOR_NASAL_RESTRICTION: 3
OD_SUPERIOR_TEMPORAL_RESTRICTION: 3
OS_INFERIOR_NASAL_RESTRICTION: 0

## 2025-08-19 ASSESSMENT — EXTERNAL EXAM - LEFT EYE: OS_EXAM: NORMAL

## 2025-08-19 ASSESSMENT — CUP TO DISC RATIO
OS_RATIO: 0.2
OD_RATIO: 0.2

## 2025-08-28 ENCOUNTER — PATIENT OUTREACH (OUTPATIENT)
Dept: CARE COORDINATION | Facility: CLINIC | Age: 80
End: 2025-08-28
Payer: MEDICARE

## (undated) DEVICE — CABLE PACING L2.5 MR CONNECTOR ALLIGATOR CLIP 343586

## (undated) DEVICE — 6F SAFE SHEATH II, 13CM ***DUPLICATE, PLEASE TRANSITION TO CAT # 667460-300

## (undated) DEVICE — Device

## (undated) DEVICE — ELECTRODE DEFIB CADENCE 22550R

## (undated) DEVICE — KIT MICROINTRODUCER VASCULAR VSI 4FRX0.018INX40CM 7195X

## (undated) RX ORDER — SODIUM CHLORIDE 9 MG/ML
INJECTION, SOLUTION INTRAVENOUS
Status: DISPENSED
Start: 2023-08-21

## (undated) RX ORDER — FENTANYL CITRATE 50 UG/ML
INJECTION, SOLUTION INTRAMUSCULAR; INTRAVENOUS
Status: DISPENSED
Start: 2023-08-21

## (undated) RX ORDER — FENTANYL CITRATE 50 UG/ML
INJECTION, SOLUTION INTRAMUSCULAR; INTRAVENOUS
Status: DISPENSED
Start: 2020-09-21

## (undated) RX ORDER — DIPHENHYDRAMINE HYDROCHLORIDE 50 MG/ML
INJECTION INTRAMUSCULAR; INTRAVENOUS
Status: DISPENSED
Start: 2023-08-21

## (undated) RX ORDER — CEFAZOLIN SODIUM 2 G/100ML
INJECTION, SOLUTION INTRAVENOUS
Status: DISPENSED
Start: 2023-08-21

## (undated) RX ORDER — LIDOCAINE HYDROCHLORIDE 20 MG/ML
INJECTION, SOLUTION INFILTRATION; PERINEURAL
Status: DISPENSED
Start: 2023-08-21

## (undated) RX ORDER — SIMETHICONE 40MG/0.6ML
SUSPENSION, DROPS(FINAL DOSAGE FORM)(ML) ORAL
Status: DISPENSED
Start: 2020-09-21